# Patient Record
Sex: FEMALE
[De-identification: names, ages, dates, MRNs, and addresses within clinical notes are randomized per-mention and may not be internally consistent; named-entity substitution may affect disease eponyms.]

---

## 2018-06-25 ENCOUNTER — CHARTING TRANS (OUTPATIENT)
Dept: OTHER | Age: 23
End: 2018-06-25

## 2018-06-25 ENCOUNTER — LAB SERVICES (OUTPATIENT)
Dept: OTHER | Age: 23
End: 2018-06-25

## 2018-06-25 LAB
APPEARANCE: NORMAL
BILIRUBIN: NORMAL
COLOR: NORMAL
GLUCOSE U: NORMAL
KETONES: NORMAL
LEUKOCYTES: NORMAL
NITRITE: NORMAL
OCCULT BLOOD: NORMAL
PH: 6
PROTEIN: NORMAL
URINE SPEC GRAVITY: 1
UROBILINOGEN: 0.2

## 2018-10-31 VITALS
DIASTOLIC BLOOD PRESSURE: 88 MMHG | TEMPERATURE: 98.6 F | SYSTOLIC BLOOD PRESSURE: 128 MMHG | HEART RATE: 78 BPM | RESPIRATION RATE: 16 BRPM

## 2022-06-21 ENCOUNTER — HOSPITAL ENCOUNTER (EMERGENCY)
Facility: HOSPITAL | Age: 27
Discharge: HOME | End: 2022-06-21
Payer: COMMERCIAL

## 2022-06-21 ENCOUNTER — APPOINTMENT (OUTPATIENT)
Dept: GENERAL RADIOLOGY | Facility: HOSPITAL | Age: 27
End: 2022-06-21
Attending: NURSE PRACTITIONER
Payer: COMMERCIAL

## 2022-06-21 VITALS
WEIGHT: 250 LBS | HEART RATE: 88 BPM | SYSTOLIC BLOOD PRESSURE: 126 MMHG | RESPIRATION RATE: 18 BRPM | DIASTOLIC BLOOD PRESSURE: 82 MMHG | BODY MASS INDEX: 40.18 KG/M2 | TEMPERATURE: 98 F | HEIGHT: 66 IN | OXYGEN SATURATION: 94 %

## 2022-06-21 DIAGNOSIS — S93.401A SPRAIN OF RIGHT ANKLE, UNSPECIFIED LIGAMENT, INITIAL ENCOUNTER: Primary | ICD-10-CM

## 2022-06-21 DIAGNOSIS — R52 PAIN: ICD-10-CM

## 2022-06-21 PROCEDURE — 73630 X-RAY EXAM OF FOOT: CPT | Performed by: NURSE PRACTITIONER

## 2022-06-21 PROCEDURE — 73610 X-RAY EXAM OF ANKLE: CPT | Performed by: NURSE PRACTITIONER

## 2022-06-21 PROCEDURE — 99283 EMERGENCY DEPT VISIT LOW MDM: CPT

## 2022-06-21 RX ORDER — VENLAFAXINE 37.5 MG/1
37.5 TABLET ORAL 2 TIMES DAILY
COMMUNITY

## 2022-06-22 NOTE — ED INITIAL ASSESSMENT (HPI)
Pt c/o right ankle pain. Pt states she fell and twisted ankle. Per pt she had sx on this ankle in the past. Pt can walk on ankle.

## 2022-06-22 NOTE — ED QUICK NOTES
Patient safe to DC home per MD. Discharge instructions reviewed with patient, including when and how to follow up. Patient verbalizes understanding. Patient ambulatory with steady gait to exit.

## 2022-09-27 RX ORDER — FLUOXETINE HYDROCHLORIDE 20 MG/1
CAPSULE ORAL
Qty: 30 CAPSULE | OUTPATIENT
Start: 2022-09-27

## 2023-08-16 ENCOUNTER — HOSPITAL ENCOUNTER (EMERGENCY)
Facility: HOSPITAL | Age: 28
Discharge: HOME | End: 2023-08-16
Attending: EMERGENCY MEDICINE
Payer: COMMERCIAL

## 2023-08-16 ENCOUNTER — APPOINTMENT (OUTPATIENT)
Dept: GENERAL RADIOLOGY | Facility: HOSPITAL | Age: 28
End: 2023-08-16
Attending: EMERGENCY MEDICINE
Payer: COMMERCIAL

## 2023-08-16 VITALS
TEMPERATURE: 99 F | DIASTOLIC BLOOD PRESSURE: 83 MMHG | BODY MASS INDEX: 38.57 KG/M2 | HEIGHT: 66 IN | RESPIRATION RATE: 13 BRPM | SYSTOLIC BLOOD PRESSURE: 132 MMHG | OXYGEN SATURATION: 100 % | WEIGHT: 240 LBS | HEART RATE: 91 BPM

## 2023-08-16 DIAGNOSIS — R07.9 CHEST PAIN OF UNCERTAIN ETIOLOGY: Primary | ICD-10-CM

## 2023-08-16 LAB
ANION GAP SERPL CALC-SCNC: 7 MMOL/L (ref 0–18)
ATRIAL RATE: 84 BPM
BASOPHILS # BLD AUTO: 0.05 X10(3) UL (ref 0–0.2)
BASOPHILS NFR BLD AUTO: 0.6 %
BUN BLD-MCNC: 15 MG/DL (ref 7–18)
BUN/CREAT SERPL: 15.6 (ref 10–20)
CALCIUM BLD-MCNC: 9.2 MG/DL (ref 8.5–10.1)
CHLORIDE SERPL-SCNC: 107 MMOL/L (ref 98–112)
CO2 SERPL-SCNC: 24 MMOL/L (ref 21–32)
CREAT BLD-MCNC: 0.96 MG/DL
D DIMER PPP FEU-MCNC: 0.3 UG/ML FEU (ref ?–0.5)
DEPRECATED RDW RBC AUTO: 42.5 FL (ref 35.1–46.3)
EGFRCR SERPLBLD CKD-EPI 2021: 82 ML/MIN/1.73M2 (ref 60–?)
EOSINOPHIL # BLD AUTO: 0.1 X10(3) UL (ref 0–0.7)
EOSINOPHIL NFR BLD AUTO: 1.2 %
ERYTHROCYTE [DISTWIDTH] IN BLOOD BY AUTOMATED COUNT: 12.9 % (ref 11–15)
GLUCOSE BLD-MCNC: 101 MG/DL (ref 70–99)
HCT VFR BLD AUTO: 41.2 %
HGB BLD-MCNC: 13.7 G/DL
IMM GRANULOCYTES # BLD AUTO: 0.02 X10(3) UL (ref 0–1)
IMM GRANULOCYTES NFR BLD: 0.2 %
INR BLD: 0.97 (ref 0.85–1.16)
LYMPHOCYTES # BLD AUTO: 2.03 X10(3) UL (ref 1–4)
LYMPHOCYTES NFR BLD AUTO: 23.4 %
MCH RBC QN AUTO: 29.9 PG (ref 26–34)
MCHC RBC AUTO-ENTMCNC: 33.3 G/DL (ref 31–37)
MCV RBC AUTO: 90 FL
MONOCYTES # BLD AUTO: 0.65 X10(3) UL (ref 0.1–1)
MONOCYTES NFR BLD AUTO: 7.5 %
NEUTROPHILS # BLD AUTO: 5.82 X10 (3) UL (ref 1.5–7.7)
NEUTROPHILS # BLD AUTO: 5.82 X10(3) UL (ref 1.5–7.7)
NEUTROPHILS NFR BLD AUTO: 67.1 %
NT-PROBNP SERPL-MCNC: 10 PG/ML (ref ?–125)
OSMOLALITY SERPL CALC.SUM OF ELEC: 287 MOSM/KG (ref 275–295)
P AXIS: 41 DEGREES
P-R INTERVAL: 138 MS
PLATELET # BLD AUTO: 371 10(3)UL (ref 150–450)
POTASSIUM SERPL-SCNC: 3.9 MMOL/L (ref 3.5–5.1)
PROTHROMBIN TIME: 12.8 SECONDS (ref 11.6–14.8)
Q-T INTERVAL: 366 MS
QRS DURATION: 72 MS
QTC CALCULATION (BEZET): 432 MS
R AXIS: 5 DEGREES
RBC # BLD AUTO: 4.58 X10(6)UL
SODIUM SERPL-SCNC: 138 MMOL/L (ref 136–145)
T AXIS: 22 DEGREES
TROPONIN I HIGH SENSITIVITY: 4 NG/L
VENTRICULAR RATE: 84 BPM
WBC # BLD AUTO: 8.7 X10(3) UL (ref 4–11)

## 2023-08-16 PROCEDURE — 85025 COMPLETE CBC W/AUTO DIFF WBC: CPT | Performed by: EMERGENCY MEDICINE

## 2023-08-16 PROCEDURE — 85610 PROTHROMBIN TIME: CPT | Performed by: EMERGENCY MEDICINE

## 2023-08-16 PROCEDURE — 93005 ELECTROCARDIOGRAM TRACING: CPT

## 2023-08-16 PROCEDURE — 99285 EMERGENCY DEPT VISIT HI MDM: CPT

## 2023-08-16 PROCEDURE — 85379 FIBRIN DEGRADATION QUANT: CPT | Performed by: EMERGENCY MEDICINE

## 2023-08-16 PROCEDURE — 83880 ASSAY OF NATRIURETIC PEPTIDE: CPT | Performed by: EMERGENCY MEDICINE

## 2023-08-16 PROCEDURE — 71045 X-RAY EXAM CHEST 1 VIEW: CPT | Performed by: EMERGENCY MEDICINE

## 2023-08-16 PROCEDURE — 80048 BASIC METABOLIC PNL TOTAL CA: CPT | Performed by: EMERGENCY MEDICINE

## 2023-08-16 PROCEDURE — 93010 ELECTROCARDIOGRAM REPORT: CPT

## 2023-08-16 PROCEDURE — 84484 ASSAY OF TROPONIN QUANT: CPT | Performed by: EMERGENCY MEDICINE

## 2023-08-16 PROCEDURE — 96374 THER/PROPH/DIAG INJ IV PUSH: CPT

## 2023-08-16 PROCEDURE — S0028 INJECTION, FAMOTIDINE, 20 MG: HCPCS | Performed by: EMERGENCY MEDICINE

## 2023-08-16 RX ORDER — MAGNESIUM HYDROXIDE/ALUMINUM HYDROXICE/SIMETHICONE 120; 1200; 1200 MG/30ML; MG/30ML; MG/30ML
30 SUSPENSION ORAL ONCE
Status: COMPLETED | OUTPATIENT
Start: 2023-08-16 | End: 2023-08-16

## 2023-08-16 RX ORDER — FAMOTIDINE 10 MG/ML
20 INJECTION, SOLUTION INTRAVENOUS ONCE
Status: COMPLETED | OUTPATIENT
Start: 2023-08-16 | End: 2023-08-16

## 2023-08-16 RX ORDER — PANTOPRAZOLE SODIUM 40 MG/1
TABLET, DELAYED RELEASE ORAL
Qty: 30 TABLET | Refills: 0 | Status: SHIPPED | OUTPATIENT
Start: 2023-08-16

## 2023-08-16 RX ORDER — BUPROPION HYDROCHLORIDE 300 MG/1
300 TABLET ORAL DAILY
COMMUNITY

## 2023-08-16 NOTE — ED INITIAL ASSESSMENT (HPI)
Past 1-1.5 hr pta, non-radiating constant lower midsternal pressure with some tightness to thoracic area. Denies sob. Pt does note feeling \"weak\" - stating feeling \"winded and a lot more difficult to do it\". Denies recent long distance trips, smoking, obc. No otc rx pta. No exacerbating/alleviating factors. 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

## 2023-08-21 ENCOUNTER — OFFICE VISIT (OUTPATIENT)
Dept: OBGYN CLINIC | Facility: CLINIC | Age: 28
End: 2023-08-21

## 2023-08-21 VITALS
HEART RATE: 86 BPM | BODY MASS INDEX: 39 KG/M2 | DIASTOLIC BLOOD PRESSURE: 86 MMHG | WEIGHT: 240.19 LBS | SYSTOLIC BLOOD PRESSURE: 125 MMHG

## 2023-08-21 DIAGNOSIS — N94.3 PMS (PREMENSTRUAL SYNDROME): ICD-10-CM

## 2023-08-21 DIAGNOSIS — N91.5 LIGHT MENSTRUAL FLOW: Primary | ICD-10-CM

## 2023-08-21 DIAGNOSIS — Z11.3 ROUTINE SCREENING FOR STI (SEXUALLY TRANSMITTED INFECTION): ICD-10-CM

## 2023-08-21 PROCEDURE — 3079F DIAST BP 80-89 MM HG: CPT | Performed by: NURSE PRACTITIONER

## 2023-08-21 PROCEDURE — 3074F SYST BP LT 130 MM HG: CPT | Performed by: NURSE PRACTITIONER

## 2023-08-21 PROCEDURE — 99203 OFFICE O/P NEW LOW 30 MIN: CPT | Performed by: NURSE PRACTITIONER

## 2023-08-21 RX ORDER — RIZATRIPTAN BENZOATE 5 MG/1
5 TABLET ORAL AS NEEDED
COMMUNITY

## 2023-09-12 ENCOUNTER — LAB ENCOUNTER (OUTPATIENT)
Dept: LAB | Facility: HOSPITAL | Age: 28
End: 2023-09-12
Attending: NURSE PRACTITIONER
Payer: COMMERCIAL

## 2023-09-12 DIAGNOSIS — N94.3 PMS (PREMENSTRUAL SYNDROME): ICD-10-CM

## 2023-09-12 LAB
CHOLEST SERPL-MCNC: 210 MG/DL (ref ?–200)
DHEA-S SERPL-MCNC: 215.1 UG/DL
EST. AVERAGE GLUCOSE BLD GHB EST-MCNC: 114 MG/DL (ref 68–126)
ESTRADIOL SERPL-MCNC: 50.5 PG/ML
FASTING PATIENT LIPID ANSWER: YES
FSH SERPL-ACNC: 6.2 MIU/ML
HBA1C MFR BLD: 5.6 % (ref ?–5.7)
HBV SURFACE AG SER-ACNC: <0.1 [IU]/L
HBV SURFACE AG SERPL QL IA: NONREACTIVE
HCV AB SERPL QL IA: NONREACTIVE
HDLC SERPL-MCNC: 71 MG/DL (ref 40–59)
LDLC SERPL CALC-MCNC: 123 MG/DL (ref ?–100)
LH SERPL-ACNC: 6.5 MIU/ML
NONHDLC SERPL-MCNC: 139 MG/DL (ref ?–130)
PROLACTIN SERPL-MCNC: 12.5 NG/ML
TRIGL SERPL-MCNC: 91 MG/DL (ref 30–149)
TSI SER-ACNC: 1.81 MIU/ML (ref 0.36–3.74)
VIT D+METAB SERPL-MCNC: 21.8 NG/ML (ref 30–100)
VLDLC SERPL CALC-MCNC: 16 MG/DL (ref 0–30)

## 2023-09-12 PROCEDURE — 84146 ASSAY OF PROLACTIN: CPT | Performed by: NURSE PRACTITIONER

## 2023-09-12 PROCEDURE — 84443 ASSAY THYROID STIM HORMONE: CPT | Performed by: NURSE PRACTITIONER

## 2023-09-12 PROCEDURE — 84410 TESTOSTERONE BIOAVAILABLE: CPT | Performed by: NURSE PRACTITIONER

## 2023-09-12 PROCEDURE — 87591 N.GONORRHOEAE DNA AMP PROB: CPT | Performed by: NURSE PRACTITIONER

## 2023-09-12 PROCEDURE — 82306 VITAMIN D 25 HYDROXY: CPT

## 2023-09-12 PROCEDURE — 87389 HIV-1 AG W/HIV-1&-2 AB AG IA: CPT | Performed by: NURSE PRACTITIONER

## 2023-09-12 PROCEDURE — 80061 LIPID PANEL: CPT | Performed by: NURSE PRACTITIONER

## 2023-09-12 PROCEDURE — 87491 CHLMYD TRACH DNA AMP PROBE: CPT | Performed by: NURSE PRACTITIONER

## 2023-09-12 PROCEDURE — 82670 ASSAY OF TOTAL ESTRADIOL: CPT | Performed by: NURSE PRACTITIONER

## 2023-09-12 PROCEDURE — 83036 HEMOGLOBIN GLYCOSYLATED A1C: CPT | Performed by: NURSE PRACTITIONER

## 2023-09-12 PROCEDURE — 82627 DEHYDROEPIANDROSTERONE: CPT | Performed by: NURSE PRACTITIONER

## 2023-09-12 PROCEDURE — 86780 TREPONEMA PALLIDUM: CPT | Performed by: NURSE PRACTITIONER

## 2023-09-12 PROCEDURE — 86803 HEPATITIS C AB TEST: CPT | Performed by: NURSE PRACTITIONER

## 2023-09-12 PROCEDURE — 87340 HEPATITIS B SURFACE AG IA: CPT | Performed by: NURSE PRACTITIONER

## 2023-09-12 PROCEDURE — 83001 ASSAY OF GONADOTROPIN (FSH): CPT | Performed by: NURSE PRACTITIONER

## 2023-09-12 PROCEDURE — 83002 ASSAY OF GONADOTROPIN (LH): CPT | Performed by: NURSE PRACTITIONER

## 2023-09-12 PROCEDURE — 36415 COLL VENOUS BLD VENIPUNCTURE: CPT | Performed by: NURSE PRACTITIONER

## 2023-09-13 LAB
C TRACH DNA SPEC QL NAA+PROBE: NEGATIVE
N GONORRHOEA DNA SPEC QL NAA+PROBE: NEGATIVE
T PALLIDUM AB SER QL: NEGATIVE

## 2023-09-18 ENCOUNTER — OFFICE VISIT (OUTPATIENT)
Dept: FAMILY MEDICINE CLINIC | Facility: CLINIC | Age: 28
End: 2023-09-18
Payer: COMMERCIAL

## 2023-09-18 VITALS
RESPIRATION RATE: 16 BRPM | OXYGEN SATURATION: 97 % | BODY MASS INDEX: 38.41 KG/M2 | SYSTOLIC BLOOD PRESSURE: 108 MMHG | TEMPERATURE: 98 F | HEIGHT: 66 IN | DIASTOLIC BLOOD PRESSURE: 70 MMHG | HEART RATE: 72 BPM | WEIGHT: 239 LBS

## 2023-09-18 DIAGNOSIS — H69.92 ACUTE DYSFUNCTION OF LEFT EUSTACHIAN TUBE: Primary | ICD-10-CM

## 2023-09-18 PROCEDURE — 3078F DIAST BP <80 MM HG: CPT | Performed by: NURSE PRACTITIONER

## 2023-09-18 PROCEDURE — 3008F BODY MASS INDEX DOCD: CPT | Performed by: NURSE PRACTITIONER

## 2023-09-18 PROCEDURE — 99202 OFFICE O/P NEW SF 15 MIN: CPT | Performed by: NURSE PRACTITIONER

## 2023-09-18 PROCEDURE — 3074F SYST BP LT 130 MM HG: CPT | Performed by: NURSE PRACTITIONER

## 2023-09-19 LAB
SEX HORM BIND GLOB: 66.3 NMOL/L
TESTOST % FREE+WEAK BND: 12.4 %
TESTOST FREE+WEAK BND: 3.8 NG/DL
TESTOSTERONE TOT /MS: 30.9 NG/DL

## 2023-09-25 ENCOUNTER — OFFICE VISIT (OUTPATIENT)
Dept: OBGYN CLINIC | Facility: CLINIC | Age: 28
End: 2023-09-25

## 2023-09-25 VITALS
HEART RATE: 91 BPM | DIASTOLIC BLOOD PRESSURE: 78 MMHG | BODY MASS INDEX: 38 KG/M2 | SYSTOLIC BLOOD PRESSURE: 125 MMHG | WEIGHT: 237.81 LBS

## 2023-09-25 DIAGNOSIS — N94.6 DYSMENORRHEA: ICD-10-CM

## 2023-09-25 DIAGNOSIS — L91.8 SKIN TAG: ICD-10-CM

## 2023-09-25 DIAGNOSIS — Z01.419 WELL WOMAN EXAM WITH ROUTINE GYNECOLOGICAL EXAM: Primary | ICD-10-CM

## 2023-09-25 DIAGNOSIS — N91.5 LIGHT MENSTRUAL FLOW: ICD-10-CM

## 2023-09-25 PROCEDURE — 3078F DIAST BP <80 MM HG: CPT | Performed by: NURSE PRACTITIONER

## 2023-09-25 PROCEDURE — 3074F SYST BP LT 130 MM HG: CPT | Performed by: NURSE PRACTITIONER

## 2023-09-25 PROCEDURE — 99395 PREV VISIT EST AGE 18-39: CPT | Performed by: NURSE PRACTITIONER

## 2023-09-25 RX ORDER — RIZATRIPTAN BENZOATE 10 MG/1
TABLET, ORALLY DISINTEGRATING ORAL
COMMUNITY
Start: 2023-05-14

## 2023-10-05 ENCOUNTER — OFFICE VISIT (OUTPATIENT)
Dept: OBGYN CLINIC | Facility: CLINIC | Age: 28
End: 2023-10-05

## 2023-10-05 VITALS
DIASTOLIC BLOOD PRESSURE: 81 MMHG | HEART RATE: 71 BPM | WEIGHT: 235.63 LBS | SYSTOLIC BLOOD PRESSURE: 115 MMHG | BODY MASS INDEX: 38 KG/M2

## 2023-10-05 DIAGNOSIS — N90.89 VULVAR LESION: Primary | ICD-10-CM

## 2023-10-05 PROCEDURE — 3079F DIAST BP 80-89 MM HG: CPT | Performed by: NURSE PRACTITIONER

## 2023-10-05 PROCEDURE — 3074F SYST BP LT 130 MM HG: CPT | Performed by: NURSE PRACTITIONER

## 2023-10-05 PROCEDURE — 56605 BIOPSY OF VULVA/PERINEUM: CPT | Performed by: NURSE PRACTITIONER

## 2023-10-05 NOTE — PROCEDURES
Vulvar Biopsy         Consent signed. Procedure discussed with patient in detail including indication, risk, benefits, alternatives and complications. Lesion Description: darj pedunculated skin lesion on left vulva    Procedure:  Betadine wash of procedural site. 1% lidocaine without epinephrine used topically for local anesthesia. Vulvar biopsy done using #1  sharp dissection of area. Silver nitrate used to achieve hemostasis. Good hemostasis noted. Patient tolerated procedure well. Plan:  Site care discussed with patient. Neosporin twice a day.   Sent to pathology

## 2023-10-23 ENCOUNTER — HOSPITAL ENCOUNTER (OUTPATIENT)
Dept: ULTRASOUND IMAGING | Facility: HOSPITAL | Age: 28
Discharge: HOME | End: 2023-10-23
Attending: NURSE PRACTITIONER

## 2023-10-23 DIAGNOSIS — N91.5 LIGHT MENSTRUAL FLOW: ICD-10-CM

## 2023-10-23 DIAGNOSIS — N94.6 DYSMENORRHEA: ICD-10-CM

## 2023-10-23 PROCEDURE — 76830 TRANSVAGINAL US NON-OB: CPT | Performed by: NURSE PRACTITIONER

## 2023-10-23 PROCEDURE — 76856 US EXAM PELVIC COMPLETE: CPT | Performed by: NURSE PRACTITIONER

## 2023-12-08 ENCOUNTER — OFFICE VISIT (OUTPATIENT)
Dept: FAMILY MEDICINE CLINIC | Facility: CLINIC | Age: 28
End: 2023-12-08
Payer: COMMERCIAL

## 2023-12-08 VITALS
HEIGHT: 66 IN | HEART RATE: 86 BPM | WEIGHT: 130 LBS | SYSTOLIC BLOOD PRESSURE: 128 MMHG | DIASTOLIC BLOOD PRESSURE: 72 MMHG | TEMPERATURE: 98 F | BODY MASS INDEX: 20.89 KG/M2 | RESPIRATION RATE: 18 BRPM | OXYGEN SATURATION: 97 %

## 2023-12-08 DIAGNOSIS — H10.33 ACUTE CONJUNCTIVITIS OF BOTH EYES, UNSPECIFIED ACUTE CONJUNCTIVITIS TYPE: ICD-10-CM

## 2023-12-08 DIAGNOSIS — J06.9 VIRAL URI WITH COUGH: Primary | ICD-10-CM

## 2023-12-08 PROCEDURE — 3078F DIAST BP <80 MM HG: CPT | Performed by: NURSE PRACTITIONER

## 2023-12-08 PROCEDURE — 3074F SYST BP LT 130 MM HG: CPT | Performed by: NURSE PRACTITIONER

## 2023-12-08 PROCEDURE — 3008F BODY MASS INDEX DOCD: CPT | Performed by: NURSE PRACTITIONER

## 2023-12-08 PROCEDURE — 99213 OFFICE O/P EST LOW 20 MIN: CPT | Performed by: NURSE PRACTITIONER

## 2023-12-08 RX ORDER — OFLOXACIN 3 MG/ML
SOLUTION/ DROPS OPHTHALMIC
Qty: 1 EACH | Refills: 0 | Status: SHIPPED | OUTPATIENT
Start: 2023-12-08

## 2024-01-22 ENCOUNTER — APPOINTMENT (OUTPATIENT)
Dept: GENERAL RADIOLOGY | Facility: HOSPITAL | Age: 29
End: 2024-01-22
Attending: EMERGENCY MEDICINE
Payer: COMMERCIAL

## 2024-01-22 ENCOUNTER — APPOINTMENT (OUTPATIENT)
Dept: ULTRASOUND IMAGING | Facility: HOSPITAL | Age: 29
End: 2024-01-22
Attending: EMERGENCY MEDICINE
Payer: COMMERCIAL

## 2024-01-22 ENCOUNTER — APPOINTMENT (OUTPATIENT)
Dept: CT IMAGING | Facility: HOSPITAL | Age: 29
End: 2024-01-22
Attending: EMERGENCY MEDICINE
Payer: COMMERCIAL

## 2024-01-22 ENCOUNTER — HOSPITAL ENCOUNTER (OUTPATIENT)
Facility: HOSPITAL | Age: 29
Setting detail: OBSERVATION
Discharge: HOME | End: 2024-01-23
Attending: EMERGENCY MEDICINE | Admitting: HOSPITALIST
Payer: COMMERCIAL

## 2024-01-22 DIAGNOSIS — R07.9 CHEST PAIN OF UNCERTAIN ETIOLOGY: ICD-10-CM

## 2024-01-22 DIAGNOSIS — R11.10 INTRACTABLE VOMITING: ICD-10-CM

## 2024-01-22 DIAGNOSIS — R10.13 EPIGASTRIC PAIN: Primary | ICD-10-CM

## 2024-01-22 PROBLEM — D72.829 LEUKOCYTOSIS: Status: ACTIVE | Noted: 2024-01-22

## 2024-01-22 PROBLEM — E87.20 METABOLIC ACIDOSIS: Status: ACTIVE | Noted: 2024-01-22

## 2024-01-22 PROBLEM — E87.5 HYPERKALEMIA: Status: ACTIVE | Noted: 2024-01-22

## 2024-01-22 PROBLEM — R73.9 HYPERGLYCEMIA: Status: ACTIVE | Noted: 2024-01-22

## 2024-01-22 LAB
ALBUMIN SERPL-MCNC: 4.8 G/DL (ref 3.2–4.8)
ALBUMIN/GLOB SERPL: 1.6 {RATIO} (ref 1–2)
ALP LIVER SERPL-CCNC: 75 U/L
ALT SERPL-CCNC: 33 U/L
ANION GAP SERPL CALC-SCNC: 9 MMOL/L (ref 0–18)
AST SERPL-CCNC: 36 U/L (ref ?–34)
B-HCG UR QL: NEGATIVE
BASOPHILS # BLD AUTO: 0.06 X10(3) UL (ref 0–0.2)
BASOPHILS NFR BLD AUTO: 0.3 %
BILIRUB SERPL-MCNC: 0.5 MG/DL (ref 0.3–1.2)
BUN BLD-MCNC: 13 MG/DL (ref 9–23)
BUN/CREAT SERPL: 12.7 (ref 10–20)
CALCIUM BLD-MCNC: 9.3 MG/DL (ref 8.7–10.4)
CHLORIDE SERPL-SCNC: 107 MMOL/L (ref 98–112)
CO2 SERPL-SCNC: 21 MMOL/L (ref 21–32)
CREAT BLD-MCNC: 1.02 MG/DL
D DIMER PPP FEU-MCNC: 0.5 UG/ML FEU (ref ?–0.5)
DEPRECATED RDW RBC AUTO: 42.4 FL (ref 35.1–46.3)
EGFRCR SERPLBLD CKD-EPI 2021: 76 ML/MIN/1.73M2 (ref 60–?)
EOSINOPHIL # BLD AUTO: 0.01 X10(3) UL (ref 0–0.7)
EOSINOPHIL NFR BLD AUTO: 0.1 %
ERYTHROCYTE [DISTWIDTH] IN BLOOD BY AUTOMATED COUNT: 13.2 % (ref 11–15)
FLUAV + FLUBV RNA SPEC NAA+PROBE: NEGATIVE
FLUAV + FLUBV RNA SPEC NAA+PROBE: NEGATIVE
GLOBULIN PLAS-MCNC: 3 G/DL (ref 2.8–4.4)
GLUCOSE BLD-MCNC: 143 MG/DL (ref 70–99)
HCT VFR BLD AUTO: 40.1 %
HGB BLD-MCNC: 13.5 G/DL
IMM GRANULOCYTES # BLD AUTO: 0.06 X10(3) UL (ref 0–1)
IMM GRANULOCYTES NFR BLD: 0.3 %
LIPASE SERPL-CCNC: 30 U/L (ref 13–75)
LYMPHOCYTES # BLD AUTO: 0.98 X10(3) UL (ref 1–4)
LYMPHOCYTES NFR BLD AUTO: 5.7 %
MCH RBC QN AUTO: 29.7 PG (ref 26–34)
MCHC RBC AUTO-ENTMCNC: 33.7 G/DL (ref 31–37)
MCV RBC AUTO: 88.1 FL
MONOCYTES # BLD AUTO: 0.52 X10(3) UL (ref 0.1–1)
MONOCYTES NFR BLD AUTO: 3 %
NEUTROPHILS # BLD AUTO: 15.6 X10 (3) UL (ref 1.5–7.7)
NEUTROPHILS # BLD AUTO: 15.6 X10(3) UL (ref 1.5–7.7)
NEUTROPHILS NFR BLD AUTO: 90.6 %
OSMOLALITY SERPL CALC.SUM OF ELEC: 287 MOSM/KG (ref 275–295)
PLATELET # BLD AUTO: 392 10(3)UL (ref 150–450)
POTASSIUM SERPL-SCNC: 5.2 MMOL/L (ref 3.5–5.1)
PROT SERPL-MCNC: 7.8 G/DL (ref 5.7–8.2)
RBC # BLD AUTO: 4.55 X10(6)UL
RSV RNA SPEC NAA+PROBE: NEGATIVE
SARS-COV-2 RNA RESP QL NAA+PROBE: NOT DETECTED
SODIUM SERPL-SCNC: 137 MMOL/L (ref 136–145)
TROPONIN I SERPL HS-MCNC: <3 NG/L
WBC # BLD AUTO: 17.2 X10(3) UL (ref 4–11)

## 2024-01-22 PROCEDURE — 96374 THER/PROPH/DIAG INJ IV PUSH: CPT

## 2024-01-22 PROCEDURE — 96376 TX/PRO/DX INJ SAME DRUG ADON: CPT

## 2024-01-22 PROCEDURE — 96375 TX/PRO/DX INJ NEW DRUG ADDON: CPT

## 2024-01-22 PROCEDURE — 83690 ASSAY OF LIPASE: CPT | Performed by: EMERGENCY MEDICINE

## 2024-01-22 PROCEDURE — 80053 COMPREHEN METABOLIC PANEL: CPT | Performed by: EMERGENCY MEDICINE

## 2024-01-22 PROCEDURE — 81025 URINE PREGNANCY TEST: CPT

## 2024-01-22 PROCEDURE — 99285 EMERGENCY DEPT VISIT HI MDM: CPT

## 2024-01-22 PROCEDURE — 85025 COMPLETE CBC W/AUTO DIFF WBC: CPT | Performed by: EMERGENCY MEDICINE

## 2024-01-22 PROCEDURE — 93005 ELECTROCARDIOGRAM TRACING: CPT

## 2024-01-22 PROCEDURE — 0241U SARS-COV-2/FLU A AND B/RSV BY PCR (GENEXPERT): CPT | Performed by: EMERGENCY MEDICINE

## 2024-01-22 PROCEDURE — 84484 ASSAY OF TROPONIN QUANT: CPT | Performed by: EMERGENCY MEDICINE

## 2024-01-22 PROCEDURE — C9113 INJ PANTOPRAZOLE SODIUM, VIA: HCPCS | Performed by: EMERGENCY MEDICINE

## 2024-01-22 PROCEDURE — 85379 FIBRIN DEGRADATION QUANT: CPT | Performed by: EMERGENCY MEDICINE

## 2024-01-22 PROCEDURE — 71045 X-RAY EXAM CHEST 1 VIEW: CPT | Performed by: EMERGENCY MEDICINE

## 2024-01-22 PROCEDURE — 71260 CT THORAX DX C+: CPT | Performed by: EMERGENCY MEDICINE

## 2024-01-22 PROCEDURE — 76705 ECHO EXAM OF ABDOMEN: CPT | Performed by: EMERGENCY MEDICINE

## 2024-01-22 PROCEDURE — 93010 ELECTROCARDIOGRAM REPORT: CPT

## 2024-01-22 PROCEDURE — S0119 ONDANSETRON 4 MG: HCPCS

## 2024-01-22 PROCEDURE — S0028 INJECTION, FAMOTIDINE, 20 MG: HCPCS | Performed by: EMERGENCY MEDICINE

## 2024-01-22 PROCEDURE — 96361 HYDRATE IV INFUSION ADD-ON: CPT

## 2024-01-22 RX ORDER — FAMOTIDINE 10 MG/ML
20 INJECTION, SOLUTION INTRAVENOUS ONCE
Status: COMPLETED | OUTPATIENT
Start: 2024-01-22 | End: 2024-01-22

## 2024-01-22 RX ORDER — ONDANSETRON 2 MG/ML
4 INJECTION INTRAMUSCULAR; INTRAVENOUS ONCE
Status: COMPLETED | OUTPATIENT
Start: 2024-01-22 | End: 2024-01-22

## 2024-01-22 RX ORDER — MORPHINE SULFATE 2 MG/ML
2 INJECTION, SOLUTION INTRAMUSCULAR; INTRAVENOUS ONCE
Status: COMPLETED | OUTPATIENT
Start: 2024-01-22 | End: 2024-01-22

## 2024-01-22 RX ORDER — DROPERIDOL 2.5 MG/ML
1.25 INJECTION, SOLUTION INTRAMUSCULAR; INTRAVENOUS ONCE
Status: COMPLETED | OUTPATIENT
Start: 2024-01-22 | End: 2024-01-22

## 2024-01-22 RX ORDER — METOCLOPRAMIDE HYDROCHLORIDE 5 MG/ML
5 INJECTION INTRAMUSCULAR; INTRAVENOUS ONCE
Status: COMPLETED | OUTPATIENT
Start: 2024-01-22 | End: 2024-01-22

## 2024-01-22 RX ORDER — MORPHINE SULFATE 4 MG/ML
4 INJECTION, SOLUTION INTRAMUSCULAR; INTRAVENOUS ONCE
Status: COMPLETED | OUTPATIENT
Start: 2024-01-22 | End: 2024-01-22

## 2024-01-22 RX ORDER — DIPHENHYDRAMINE HYDROCHLORIDE 50 MG/ML
25 INJECTION INTRAMUSCULAR; INTRAVENOUS ONCE
Status: COMPLETED | OUTPATIENT
Start: 2024-01-22 | End: 2024-01-22

## 2024-01-22 RX ORDER — ONDANSETRON 4 MG/1
4 TABLET, ORALLY DISINTEGRATING ORAL ONCE
Status: COMPLETED | OUTPATIENT
Start: 2024-01-22 | End: 2024-01-22

## 2024-01-23 VITALS
DIASTOLIC BLOOD PRESSURE: 69 MMHG | WEIGHT: 248 LBS | HEIGHT: 66 IN | BODY MASS INDEX: 39.86 KG/M2 | RESPIRATION RATE: 18 BRPM | TEMPERATURE: 100 F | SYSTOLIC BLOOD PRESSURE: 116 MMHG | OXYGEN SATURATION: 96 % | HEART RATE: 103 BPM

## 2024-01-23 PROBLEM — R07.9 CHEST PAIN OF UNCERTAIN ETIOLOGY: Status: ACTIVE | Noted: 2024-01-23

## 2024-01-23 PROBLEM — R11.10 INTRACTABLE VOMITING: Status: ACTIVE | Noted: 2024-01-23

## 2024-01-23 LAB
AMPHET UR QL SCN: NEGATIVE
ATRIAL RATE: 93 BPM
BARBITURATES UR QL SCN: NEGATIVE
BENZODIAZ UR QL SCN: NEGATIVE
CANNABINOIDS UR QL SCN: NEGATIVE
COCAINE UR QL: NEGATIVE
CREAT UR-SCNC: 31.1 MG/DL
MDMA UR QL SCN: NEGATIVE
METHADONE UR QL SCN: NEGATIVE
OXYCODONE UR QL SCN: NEGATIVE
P AXIS: 29 DEGREES
P-R INTERVAL: 146 MS
PCP UR QL SCN: NEGATIVE
POTASSIUM SERPL-SCNC: 3.8 MMOL/L (ref 3.5–5.1)
Q-T INTERVAL: 358 MS
QRS DURATION: 78 MS
QTC CALCULATION (BEZET): 445 MS
R AXIS: 6 DEGREES
T AXIS: 41 DEGREES
VENTRICULAR RATE: 93 BPM

## 2024-01-23 PROCEDURE — 80307 DRUG TEST PRSMV CHEM ANLYZR: CPT | Performed by: EMERGENCY MEDICINE

## 2024-01-23 PROCEDURE — 84132 ASSAY OF SERUM POTASSIUM: CPT | Performed by: HOSPITALIST

## 2024-01-23 RX ORDER — PROCHLORPERAZINE EDISYLATE 5 MG/ML
5 INJECTION INTRAMUSCULAR; INTRAVENOUS EVERY 8 HOURS PRN
Status: DISCONTINUED | OUTPATIENT
Start: 2024-01-23 | End: 2024-01-23

## 2024-01-23 RX ORDER — PANTOPRAZOLE SODIUM 40 MG/1
40 TABLET, DELAYED RELEASE ORAL
Status: DISCONTINUED | OUTPATIENT
Start: 2024-01-23 | End: 2024-01-23

## 2024-01-23 RX ORDER — PANTOPRAZOLE SODIUM 40 MG/1
40 TABLET, DELAYED RELEASE ORAL
Qty: 7 TABLET | Refills: 0 | Status: SHIPPED | OUTPATIENT
Start: 2024-01-23

## 2024-01-23 RX ORDER — MORPHINE SULFATE 2 MG/ML
2 INJECTION, SOLUTION INTRAMUSCULAR; INTRAVENOUS EVERY 2 HOUR PRN
Status: DISCONTINUED | OUTPATIENT
Start: 2024-01-23 | End: 2024-01-23

## 2024-01-23 RX ORDER — MORPHINE SULFATE 2 MG/ML
1 INJECTION, SOLUTION INTRAMUSCULAR; INTRAVENOUS EVERY 2 HOUR PRN
Status: DISCONTINUED | OUTPATIENT
Start: 2024-01-23 | End: 2024-01-23

## 2024-01-23 RX ORDER — ACETAMINOPHEN 500 MG
500 TABLET ORAL EVERY 4 HOURS PRN
Status: DISCONTINUED | OUTPATIENT
Start: 2024-01-23 | End: 2024-01-23

## 2024-01-23 RX ORDER — ENOXAPARIN SODIUM 100 MG/ML
0.5 INJECTION SUBCUTANEOUS DAILY
Status: DISCONTINUED | OUTPATIENT
Start: 2024-01-23 | End: 2024-01-23

## 2024-01-23 RX ORDER — MORPHINE SULFATE 4 MG/ML
4 INJECTION, SOLUTION INTRAMUSCULAR; INTRAVENOUS EVERY 2 HOUR PRN
Status: DISCONTINUED | OUTPATIENT
Start: 2024-01-23 | End: 2024-01-23

## 2024-01-23 RX ORDER — ONDANSETRON 2 MG/ML
4 INJECTION INTRAMUSCULAR; INTRAVENOUS EVERY 6 HOURS PRN
Status: DISCONTINUED | OUTPATIENT
Start: 2024-01-23 | End: 2024-01-23

## 2024-01-23 RX ORDER — SODIUM CHLORIDE 9 MG/ML
INJECTION, SOLUTION INTRAVENOUS CONTINUOUS
Status: DISCONTINUED | OUTPATIENT
Start: 2024-01-23 | End: 2024-01-23

## 2024-01-23 NOTE — PROGRESS NOTES
[facility] Pharmacy Note:  Anticoagulation Weight Dose Adjustment for enoxaparin    Priya Soriano is a 28 year old patient who has been prescribed enoxaparin 40 mg every 24 hours.      Estimated Creatinine Clearance: 75.5 mL/min (based on SCr of 1.02 mg/dL). Body mass index is 40.03 kg/m². Wt Readings from Last 1 Encounters:   01/23/24 112.5 kg (248 lb 0.3 oz)        Per P&T approved dose adjustment protocol for weight and renal function, the dose will be adjusted to enoxaparin 60 mg every 24 hours.    Thank you,    Jose Finch, PharmD  1/23/2024  12:59 AM

## 2024-01-23 NOTE — DISCHARGE SUMMARY
[facility] Internal Medicine Discharge Summary   Patient ID:  Priya Soriano  [id number]  28 year old  5/26/1995    Admit date: 1/22/2024    Discharge date and time: 1/23/2024  1:29 PM      Attending Physician: No att. providers found     Primary Care Physician: Zaira Ram MD     Reason for admission n/v (see HPI on HP for further detail)    Discharged Condition: stable    Disposition: home    Risk of Readmission Lace+ Score: 25  59-90 High Risk  29-58 Medium Risk  0-28   Low Risk    Important follow up:  -Please avoid NSAIDS including diclofenac/ibuprofen/aleve etc    If needed in future, always take with food and consider acid supressor with medication     Discharge meds  I reviewed and reconciled current and discharge medications on the day of discharge with the changes reflected below.       Medication List        START taking these medications      pantoprazole 40 MG Tbec  Commonly known as: Protonix  Take 1 tablet (40 mg total) by mouth every morning before breakfast.            CONTINUE taking these medications      buPROPion  MG Tb24  Commonly known as: Wellbutrin XL     * Rizatriptan Benzoate 5 MG Tabs  Commonly known as: MAXALT     * rizatriptan 10 MG Tbdp  Commonly known as: Maxalt-MLT     sertraline 50 MG Tabs  Commonly known as: Zoloft           * This list has 2 medication(s) that are the same as other medications prescribed for you. Read the directions carefully, and ask your doctor or other care provider to review them with you.                   Where to Get Your Medications        These medications were sent to [facility]/pharmacy #[id number] - [place], [place] - [address] AT [facility], [phone], [phone]  [address] [facility] [address] [address]      Hours: 24-hours Phone: [phone]   pantoprazole 40 MG Tbec       HPI per chart  Priya Soriano is a 28 year old female with PMH sig for generalized anxiety disorder, major depression, migraine who  presented with nausea vomiting abdominal pain.  Symptoms now resolved.  Patient thinks that this is due to her recent use of diclofenac.  She used it this summer to after knee injury and she had similar symptoms.  Tolerating diet and like to go home.  No chest pain or shortness of breath.  No further nausea vomiting.  No diarrhea     Hospital Course  Priya Soriano is a 28 year old female with PMH sig for generalized anxiety disorder, major depression, migraine who presented with nausea vomiting abdominal pain.  Symptoms now resolved.  Patient thinks that this is due to her recent use of diclofenac.  She used it this summer to after knee injury and she had similar symptoms.  Tolerating diet and like to go home.  No chest pain or shortness of breath.  No further nausea vomiting.  No diarrhea   Discharge Diagnoses:   Nausea vomiting without diarrhea  - Symptoms are resolved, likely gastritis  - Patient advised to stop diclofenac and avoid all NSAIDs, continue PPI for 1 week and follow-up with PCP  - Patient tolerating diet prior to discharge     Generalized anxiety disorder, major pressure disorder pressure [place]  Migraines  - Continue home meds    Consults: None    Radiology: US GALLBLADDER (CPT=76705)    Result Date: 1/23/2024  PROCEDURE: US GALLBLADDER (CPT=76705)  COMPARISON: [facility], CT CHEST PE AORTA (IV ONLY) (CPT=71260), 1/22/2024, 9:30 PM.  INDICATIONS: Epigastric pain, nausea, and vomiting  TECHNIQUE:   The gallbladder was evaluated with grayscale ultrasound.   FINDINGS: Normal-sized gallbladder.  No calculi or wall thickening.  Slightly enlarged CBD measuring 7 millimeter.  Distal CBD is not visible.  Further workup could be considered if there is clinical/biochemical evidence of biliary obstruction.  The liver is likely fatty         CONCLUSION: No gallstones.  Slightly enlarged CBD, nonspecific  [facility] provided a prelim report for this exam. This final report has no  significant discrepancies with the [facility] report.    Dictated by (CST): Tha Palomares MD on 1/23/2024 at 6:59 AM     Finalized by (CST): Tha Palomares MD on 1/23/2024 at 7:00 AM          CT CHEST PE AORTA (IV ONLY) (CPT=71260)    Result Date: 1/22/2024  PROCEDURE: CT CHEST PE AORTA (IV ONLY) (CPT=71260)  COMPARISON: None.  INDICATIONS: Chest pain, vomiting, chills  TECHNIQUE: CT images of the chest were obtained with non-ionic intravenous contrast material.  Automated exposure control for dose reduction was used. Adjustment of the mA and/or kV was done based on the patient's size. Use of iterative reconstruction technique for dose reduction was used. Dose information is transmitted to the ACR (American College of Radiology) NRDR (National Radiology Data Registry) which includes the Dose Index Registry.  FINDINGS:  Mildly limited examination due to phase of contrast and motion artifact.   PULMONARY VASCULATURE: There is adequate opacification of the pulmonary arterial tree. No suspicious filling defects are identified in the main, lobar, or segmental pulmonary artery branches to suggest acute pulmonary embolism.   The subsegment branches are less well assessed. The main pulmonary artery trunk is normal in caliber.  CARDIAC: The heart is not enlarged. There is no bowing of the interventricular septum to suggest right ventricular strain. THORACIC AORTA: There is no ectasia of the ascending thoracic aorta without aneurysmal dilation.  MEDIASTINUM/FLORESITA: No mass or lymphadenopathy.    LUNGS/PLEURA: The trachea and central bronchi are clear.  There is minimal curvilinear opacity dependently within the lungs, most likely minimal subsegmental atelectasis.  CHEST WALL: No thyroidal mass, within the limitation of artifact. No axillary lymphadenopathy.  LIMITED ABDOMEN: Within the parameters of the arterial phase of contrast-enhancement, the included upper abdomen is unremarkable  BONES: Minimal dextroscoliosis.           CONCLUSION:  No acute pulmonary embolism to the segmental level.     Dictated by (CST): Danielle Torres MD on 1/22/2024 at 10:15 PM     Finalized by (CST): Danielle Torres MD on 1/22/2024 at 10:20 PM          XR CHEST AP PORTABLE  (CPT=71045)    Result Date: 1/22/2024  PROCEDURE: XR CHEST AP PORTABLE  (CPT=71045) TIME: 2049 hours  COMPARISON: [facility], XR CHEST AP PORTABLE (CPT=71045), 8/16/2023, 10:39 AM.  INDICATIONS: Chest pain, vomiting, chills  TECHNIQUE:   Single view.   FINDINGS:  CARDIAC/VASC: Normal.  No cardiac silhouette abnormality or cardiomegaly.  Unremarkable pulmonary vasculature.  MEDIAST/FLORESITA:   No visible mass or adenopathy. LUNGS/PLEURA: Normal.  No significant pulmonary parenchymal abnormalities.  No effusion or pleural thickening. BONES: No fracture or visible bony lesion. OTHER: Negative.          CONCLUSION: Normal examination.     Dictated by (CST): Dilan Cuellar MD on 1/22/2024 at 9:02 PM     Finalized by (CST): Dilan Cuellar MD on 1/22/2024 at 9:03 PM             Operative Procedures:      Day of discharge see same day progress note     Total Time Coordinating Care: > than 30 minutes    Note: This chart was prepared using voice recognition software and may contain unintended word substitution errors.     Patient had opportunity to ask questions and state understand and agree with therapeutic plan as outlined

## 2024-01-23 NOTE — H&P
[facility]     DM Hospitalist H&P     CC:   Chief Complaint   Patient presents with    Chest Pain        PCP: Zaira Ram MD      Assessment and Plan     Priya Soriano is a 28 year old female with PMH sig for generalized anxiety disorder, major depression, migraine who presented with nausea vomiting abdominal pain.  Symptoms now resolved.  Patient thinks that this is due to her recent use of diclofenac.  She used it this summer to after knee injury and she had similar symptoms.  Tolerating diet and like to go home.  No chest pain or shortness of breath.  No further nausea vomiting.  No diarrhea    Nausea vomiting without diarrhea  - Symptoms are resolved, likely gastritis  - Patient advised to stop diclofenac and avoid all NSAIDs, continue PPI for 1 week and follow-up with PCP  - Patient tolerating diet prior to discharge    Generalized anxiety disorder, major pressure disorder pressure [place]  Migraines  - Continue home meds    Dispo: [place] home    Outpatient records reviewed confirming patient's medical history and medications.     Further recommendations pending patient's clinical course.  [facility] [place] to continue to follow patient while in house    Patient and/or patient's family given opportunity to ask questions and note understanding and agreeing with therapeutic plan as outlined    Note: This chart was prepared using voice recognition software and may contain unintended word substitution errors.       Thank You,  Elvie Queen MD 1/23/24  [facility]   Answering service: [phone]      HPI     Pryia Soriano is a 28 year old female with PMH sig for generalized anxiety disorder, major depression, migraine who presented with nausea vomiting abdominal pain.  Symptoms now resolved.  Patient thinks that this is due to her recent use of diclofenac.  She used it this summer to after knee injury and she had similar symptoms.  Tolerating diet and like to go home.   No chest pain or shortness of breath.  No further nausea vomiting.  No diarrhea    Review of Systems  12 point systems reviewed, please see HPI for pertinent positives, otherwise negative    History     PMH  Past Medical History:   Diagnosis Date    Anxiety     Depression     Migraine     with and without aura        PSH  Past Surgical History:   Procedure Laterality Date    ANKLE FRACTURE SURGERY Right 2014        ALL:  Allergies   Allergen Reactions    Oysters NAUSEA AND VOMITING        Home Medications:  Outpatient Medications Marked as Taking for the 1/22/24 encounter (Hospital Encounter)   Medication Sig Dispense Refill    pantoprazole 40 MG Oral Tab EC Take 1 tablet (40 mg total) by mouth every morning before breakfast. 7 tablet 0    sertraline 50 MG Oral Tab Take 1 tablet (50 mg total) by mouth daily.      buPROPion  MG Oral Tablet 24 Hr Take 1 tablet (300 mg total) by mouth daily.         Soc Hx  Social History     Tobacco Use    Smoking status: Never    Smokeless tobacco: Never   Substance Use Topics    Alcohol use: Yes     Comment: very infrequent, 1-2/month        Fam Hx  Family History   Problem Relation Age of Onset    Lipids Father     Other (prediabetic) Father     Hypertension Mother     Thyroid disease Mother     Heart Disease Maternal Grandmother     Dementia Paternal Grandmother     No Known Problems Brother     No Known Problems Brother            Objective     Temp: 99.5 °F (37.5 °C)  Pulse: 103  Resp: 18  BP: 116/69    Exam  Gen: No acute distress, alert and oriented x3  Neck Supple, no JVD  Pulm: Lungs clear, normal respiratory effort, No wheezing or crackles  CV: Heart with regular rate and rhythm, No murmurs, rubs, gallops  Abd: Abdomen soft, nontender, nondistended, no organomegaly, bowel sounds present  MSK:   no clubbing, no cyanosis.  No Lower extremity edema  Skin: no rashes or lesions, well perfused  Psych: mood stable, cooperative  Neuro: No focal deficits    Diagnostic Data:     CBC/Chem  Recent Labs   Lab 01/22/24 2007   WBC 17.2*   HGB 13.5   MCV 88.1   .0       Recent Labs   Lab 01/22/24 2007 01/23/24  1012     --    K 5.2* 3.8     --    CO2 21.0  --    BUN 13  --    CREATSERUM 1.02  --    *  --    CA 9.3  --        Recent Labs   Lab 01/22/24 2007   ALT 33   AST 36*   ALB 4.8       No results for input(s): \"TROP\" in the last 168 hours.    Additional Diagnostics:      Radiology: US GALLBLADDER (CPT=76705)    Result Date: 1/23/2024  PROCEDURE: US GALLBLADDER (CPT=76705)  COMPARISON: [facility], CT CHEST PE AORTA (IV ONLY) (CPT=71260), 1/22/2024, 9:30 PM.  INDICATIONS: Epigastric pain, nausea, and vomiting  TECHNIQUE:   The gallbladder was evaluated with grayscale ultrasound.   FINDINGS: Normal-sized gallbladder.  No calculi or wall thickening.  Slightly enlarged CBD measuring 7 millimeter.  Distal CBD is not visible.  Further workup could be considered if there is clinical/biochemical evidence of biliary obstruction.  The liver is likely fatty         CONCLUSION: No gallstones.  Slightly enlarged CBD, nonspecific  [facility] provided a prelim report for this exam. This final report has no significant discrepancies with the [facility] report.    Dictated by (CST): Tha Palomares MD on 1/23/2024 at 6:59 AM     Finalized by (CST): Tha Palomares MD on 1/23/2024 at 7:00 AM          CT CHEST PE AORTA (IV ONLY) (CPT=71260)    Result Date: 1/22/2024  PROCEDURE: CT CHEST PE AORTA (IV ONLY) (CPT=71260)  COMPARISON: None.  INDICATIONS: Chest pain, vomiting, chills  TECHNIQUE: CT images of the chest were obtained with non-ionic intravenous contrast material.  Automated exposure control for dose reduction was used. Adjustment of the mA and/or kV was done based on the patient's size. Use of iterative reconstruction technique for dose reduction was used. Dose information is transmitted to the ACR (American College of Radiology) NRDR (National Radiology Data  Registry) which includes the Dose Index Registry.  FINDINGS:  Mildly limited examination due to phase of contrast and motion artifact.   PULMONARY VASCULATURE: There is adequate opacification of the pulmonary arterial tree. No suspicious filling defects are identified in the main, lobar, or segmental pulmonary artery branches to suggest acute pulmonary embolism.   The subsegment branches are less well assessed. The main pulmonary artery trunk is normal in caliber.  CARDIAC: The heart is not enlarged. There is no bowing of the interventricular septum to suggest right ventricular strain. THORACIC AORTA: There is no ectasia of the ascending thoracic aorta without aneurysmal dilation.  MEDIASTINUM/FLORESITA: No mass or lymphadenopathy.    LUNGS/PLEURA: The trachea and central bronchi are clear.  There is minimal curvilinear opacity dependently within the lungs, most likely minimal subsegmental atelectasis.  CHEST WALL: No thyroidal mass, within the limitation of artifact. No axillary lymphadenopathy.  LIMITED ABDOMEN: Within the parameters of the arterial phase of contrast-enhancement, the included upper abdomen is unremarkable  BONES: Minimal dextroscoliosis.          CONCLUSION:  No acute pulmonary embolism to the segmental level.     Dictated by (CST): Danielle Torres MD on 1/22/2024 at 10:15 PM     Finalized by (CST): Danielle Torres MD on 1/22/2024 at 10:20 PM          XR CHEST AP PORTABLE  (CPT=71045)    Result Date: 1/22/2024  PROCEDURE: XR CHEST AP PORTABLE  (CPT=71045) TIME: 2049 hours  COMPARISON: [facility], XR CHEST AP PORTABLE (CPT=71045), 8/16/2023, 10:39 AM.  INDICATIONS: Chest pain, vomiting, chills  TECHNIQUE:   Single view.   FINDINGS:  CARDIAC/VASC: Normal.  No cardiac silhouette abnormality or cardiomegaly.  Unremarkable pulmonary vasculature.  MEDIAST/FLORESITA:   No visible mass or adenopathy. LUNGS/PLEURA: Normal.  No significant pulmonary parenchymal abnormalities.  No effusion or pleural  thickening. BONES: No fracture or visible bony lesion. OTHER: Negative.          CONCLUSION: Normal examination.     Dictated by (CST): Dilan Cuellar MD on 1/22/2024 at 9:02 PM     Finalized by (CST): Dilan Cuellar MD on 1/22/2024 at 9:03 PM

## 2024-01-23 NOTE — ED QUICK NOTES
Orders for admission, patient is aware of plan and ready to go upstairs. Any questions, please call ED RN Enedina at extension [phone].     Patient Covid vaccination status: Unvaccinated     COVID Test Ordered in ED: SARS-CoV-2/Flu A and B/RSV by PCR (GeneXpert)    COVID Suspicion at Admission: N/A    Running Infusions:      Mental Status/LOC at time of transport: AOx4    Other pertinent information:   CIWA score: N/A   NIH score:  N/A

## 2024-01-23 NOTE — ED INITIAL ASSESSMENT (HPI)
Pt arrives through triage with complaints of mid sternal pain that started earlier today. +vomiting. Denies dizziness/HA

## 2024-01-23 NOTE — ED PROVIDER NOTES
Patient Seen in: [facility] Emergency Department      History     Chief Complaint   Patient presents with    Chest Pain     Stated Complaint: Chest pain, vomiting, chills    Subjective:   HPI    Patient presents emergency department complaining of chest pain with chills and vomiting.  She states that this afternoon she developed midsternal chest burning sensation with vomiting.  She has had some chills and significant nausea.  She denies headache or dizziness.  There is no documented fever.  There is no cough or cold symptoms.    Objective:   Past Medical History:   Diagnosis Date    Anxiety     Depression     Migraine     with and without aura              Past Surgical History:   Procedure Laterality Date    ANKLE FRACTURE SURGERY Right 2014                Social History     Socioeconomic History    Marital status: Single   Tobacco Use    Smoking status: Never    Smokeless tobacco: Never   Vaping Use    Vaping Use: Never used   Substance and Sexual Activity    Alcohol use: Yes     Comment: very infrequent, 1-2/month    Drug use: Yes     Types: Cannabis     Comment: gummies     Social Determinants of Health     Food Insecurity: No Food Insecurity (1/23/2024)    Food Insecurity     Food Insecurity: Never true   Transportation Needs: No Transportation Needs (1/23/2024)    Transportation Needs     Lack of Transportation: No   Housing Stability: Low Risk  (1/23/2024)    Housing Stability     Housing Instability: No              Review of Systems    Positive for stated complaint: Chest pain, vomiting, chills  Other systems are as noted in HPI.  Constitutional and vital signs reviewed.      All other systems reviewed and negative except as noted above.    Physical Exam     ED Triage Vitals [01/22/24 1900]   /84   Pulse 85   Resp 20   Temp 98 °F (36.7 °C)   Temp src Temporal   SpO2 99 %   O2 Device None (Room air)       Current:/69 (BP Location: Right arm)   Pulse 103   Temp 99.5 °F (37.5 °C) (Oral)    Resp 18   Ht 167.6 cm (5' 6\")   Wt 112.5 kg   LMP 01/20/2024 (Exact Date)   SpO2 96%   BMI 40.03 kg/m²         Physical Exam  Vitals and nursing note reviewed.   Constitutional:       General: She is not in acute distress.     Appearance: She is well-developed.   HENT:      Head: Normocephalic.      Nose: Nose normal.      Mouth/Throat:      Mouth: Mucous membranes are moist.   Eyes:      Conjunctiva/sclera: Conjunctivae normal.   Cardiovascular:      Rate and Rhythm: Normal rate and regular rhythm.      Heart sounds: No murmur heard.  Pulmonary:      Effort: Pulmonary effort is normal. No respiratory distress.      Breath sounds: Normal breath sounds.   Abdominal:      General: There is no distension.      Palpations: Abdomen is soft.      Tenderness: There is abdominal tenderness in the right upper quadrant and epigastric area. There is no guarding or rebound.   Musculoskeletal:         General: No tenderness. Normal range of motion.      Cervical back: Normal range of motion and neck supple.   Skin:     General: Skin is warm and dry.      Capillary Refill: Capillary refill takes less than 2 seconds.      Findings: No rash.   Neurological:      General: No focal deficit present.      Mental Status: She is alert and oriented to person, place, and time.      Cranial Nerves: No cranial nerve deficit.      Motor: No weakness.   Psychiatric:         Mood and Affect: Mood normal.               ED Course     Labs Reviewed   COMP METABOLIC PANEL (14) - Abnormal; Notable for the following components:       Result Value    Glucose 143 (*)     Potassium 5.2 (*)     AST 36 (*)     All other components within normal limits   D-DIMER - Abnormal; Notable for the following components:    D-Dimer 0.50 (*)     All other components within normal limits   DRUG ABUSE PANEL 10 SCREEN - Abnormal; Notable for the following components:    Opiate Urine Presumed Positive (*)     All other components within normal limits   CBC W/  DIFFERENTIAL - Abnormal; Notable for the following components:    WBC 17.2 (*)     Neutrophil Absolute Prelim 15.60 (*)     Neutrophil Absolute 15.60 (*)     Lymphocyte Absolute 0.98 (*)     All other components within normal limits   TROPONIN I HIGH SENSITIVITY - Normal   LIPASE - Normal   POTASSIUM - Normal   POCT PREGNANCY URINE - Normal   SARS-COV-2/FLU A AND B/RSV BY PCR (GENEXPERT) - Normal    Narrative:     This test is intended for the qualitative detection and differentiation of SARS-CoV-2, influenza A, influenza B, and respiratory syncytial virus (RSV) viral RNA in nasopharyngeal or nares swabs from individuals suspected of respiratory viral infection consistent with COVID-19 by their healthcare provider. Signs and symptoms of respiratory viral infection due to SARS-CoV-2, influenza, and RSV can be similar.    Test performed using the Xpert Xpress SARS-CoV-2/FLU/RSV (real time RT-PCR)  assay on the Auto [employer] instrument, [employer], [place] [address].   This test is being used under the Food and Drug Administration's Emergency Use Authorization.    The authorized Fact Sheet for Healthcare Providers for this assay is available upon request from the laboratory.   CBC WITH DIFFERENTIAL WITH PLATELET    Narrative:     The following orders were created for panel order CBC With Differential With Platelet.  Procedure                               Abnormality         Status                     ---------                               -----------         ------                     CBC W/ DIFFERENTIAL[[id number]]          Abnormal            Final result                 Please view results for these tests on the individual orders.   RAINBOW DRAW LAVENDER   RAINBOW DRAW LIGHT GREEN   RAINBOW DRAW LAVENDER     EKG    Rate, intervals and axes as noted on EKG Report.  Rate: 93 bpm  Rhythm: Sinus Rhythm  Reading: Normal EKG                          [facility]        Admission disposition: 1/22/2024 11:29 PM                     Medical Decision Making  There is a diagnosis considered for viral illness, reflux esophagitis, gastritis, bowel obstruction, biliary colic.    Problems Addressed:  Chest pain of uncertain etiology: acute illness or injury  Epigastric pain: acute illness or injury  Intractable vomiting: acute illness or injury    Amount and/or Complexity of Data Reviewed  Labs: ordered. Decision-making details documented in ED Course.     Details: CBC shows elevated white blood cell count.  Chemistry, LFTs and lipase all unremarkable.  D-dimer elevated  Radiology: ordered and independent interpretation performed. Decision-making details documented in ED Course.     Details: CT of the chest shows no evidence of PE.  Ultrasound of the gallbladder shows no stones.  ECG/medicine tests: ordered and independent interpretation performed. Decision-making details documented in ED Course.  Discussion of management or test interpretation with external provider(s): Despite numerous antiemetics, patient persisted and vomiting.  Will admit for observation with GI consultation.    Risk  Prescription drug management.  Parenteral controlled substances.  Decision regarding hospitalization.        Disposition and Plan     Clinical Impression:  1. Epigastric pain    2. Intractable vomiting    3. Chest pain of uncertain etiology         Disposition:  Admit  1/22/2024 11:29 pm    Follow-up:  Zaira Ram MD  [address] [address]  [phone]    Follow up in 1 week(s)            Medications Prescribed:  Discharge Medication List as of 1/23/2024 12:54 PM        START taking these medications    Details   pantoprazole 40 MG Oral Tab EC Take 1 tablet (40 mg total) by mouth every morning before breakfast., Normal, Disp-7 tablet, R-0                               Hospital Problems       Present on Admission  Date Reviewed: 12/8/2023            ICD-10-CM Noted POA    * (Principal) Epigastric pain R10.13  1/22/2024 Unknown    Chest pain of uncertain etiology R07.9 1/23/2024 Unknown    Hyperglycemia R73.9 1/22/2024 Yes    Hyperkalemia E87.5 1/22/2024 Yes    Intractable vomiting R11.10 1/23/2024 Unknown    Leukocytosis D72.829 1/22/2024 Yes    Metabolic acidosis E87.20 1/22/2024 Yes

## 2024-01-23 NOTE — DISCHARGE INSTRUCTIONS
Please avoid NSAIDS including diclofenac/ibuprofen/aleve etc    If needed in future, always take with food and consider acid supressor with medication

## 2024-01-23 NOTE — PLAN OF CARE
Admit as medical overflow to EDOF12  C/o midsternal pain 4/10 and nausea that is worse with movement and pain  Antiemetic given  Hospitalist and GI paged for orders  IVF started  Call light within reach, updated on POC    Problem: Patient Centered Care  Goal: Patient preferences are identified and integrated in the patient's plan of care  Description: Interventions:  - What would you like us to know as we care for you? From home  - Provide timely, complete, and accurate information to patient/family  - Incorporate patient and family knowledge, values, beliefs, and cultural backgrounds into the planning and delivery of care  - Encourage patient/family to participate in care and decision-making at the level they choose  - Honor patient and family perspectives and choices  Outcome: Progressing     Problem: Patient/Family Goals  Goal: Patient/Family Long Term Goal  Description: Patient's Long Term Goal: discharge    Interventions:  - control pain and nausea  - See additional Care Plan goals for specific interventions  Outcome: Progressing  Goal: Patient/Family Short Term Goal  Description: Patient's Short Term Goal: less nausea    Interventions:   - trial antiemetics for nausea control  - See additional Care Plan goals for specific interventions  Outcome: Progressing

## 2024-02-23 ENCOUNTER — PATIENT MESSAGE (OUTPATIENT)
Dept: OBGYN CLINIC | Facility: CLINIC | Age: 29
End: 2024-02-23

## 2024-02-23 NOTE — TELEPHONE ENCOUNTER
From: Priya Soriano  To: Zaira Riggins  Sent: 2/23/2024 11:44 AM CST  Subject: Birth Control    Hi!     The gummies with the mao haven’t been working as well the last couple months in minimizing the PMS symptoms. It’s fairly severe the week before and week during my period. I’m wondering if it might be possible to start a birth control pill that might help? If you want to make an appointment to see you and discuss I can.     Thank you!   Priya

## 2024-02-29 ENCOUNTER — OFFICE VISIT (OUTPATIENT)
Dept: OBGYN CLINIC | Facility: CLINIC | Age: 29
End: 2024-02-29

## 2024-02-29 VITALS
DIASTOLIC BLOOD PRESSURE: 82 MMHG | WEIGHT: 234 LBS | HEART RATE: 85 BPM | BODY MASS INDEX: 38 KG/M2 | SYSTOLIC BLOOD PRESSURE: 121 MMHG

## 2024-02-29 DIAGNOSIS — N94.3 PMS (PREMENSTRUAL SYNDROME): Primary | ICD-10-CM

## 2024-02-29 PROCEDURE — 99213 OFFICE O/P EST LOW 20 MIN: CPT | Performed by: NURSE PRACTITIONER

## 2024-02-29 RX ORDER — ACETAMINOPHEN AND CODEINE PHOSPHATE 120; 12 MG/5ML; MG/5ML
0.35 SOLUTION ORAL DAILY
Qty: 84 TABLET | Refills: 4 | Status: SHIPPED | OUTPATIENT
Start: 2024-02-29

## 2024-02-29 NOTE — PROGRESS NOTES
[facility]   Obstetrics and Gynecology    Priya Soriano is a 28 year old female  Patient's last menstrual period was 2024 (approximate).   Chief Complaint   Patient presents with    Consult     Pt would like to discuss birth control options   . Used chasteberry for a few months for PMS and did help. Now not helping as much. No si or HI, but \"orlando is falling\" and calls therapist for support. Patient desires to discuss birth control to try and suppress ovulation/PMS symptoms.  She is on wellbutrin and sertraline currently. Sees therapist.    Periods are monthly, lasting 3-4 days, light flow    Hx of migraine with aura. Not sexually active.    Pap:2021 normal pap  Contraception: none    OBSTETRICS HISTORY:  OB History    Para Term  AB Living   0 0 0 0 0 0   SAB IAB Ectopic Multiple Live Births   0 0 0 0 0       GYNE HISTORY:  Menarche: 11 y/o (2024  5:06 PM)  Period Cycle (Days): Monthly (2024  5:06 PM)  Period Duration (Days): 3-4days (2024  5:06 PM)  Period Flow: Very light for the past year per pt // extreme pms per pt (2024  5:06 PM)  Use of Birth Control (if yes, specify type): None (2024  5:06 PM)  Pap Date: 21 (2023  3:05 PM)  Pap Result Notes: Neg Pap (10/5/2023  4:02 PM)      History   Sexual Activity    Sexual activity: Not Currently       MEDICAL HISTORY:  Past Medical History:   Diagnosis Date    Anxiety     Depression     Migraine     with and without aura       SOCIAL HISTORY:  Social History     Socioeconomic History    Marital status: Single     Spouse name: Not on file    Number of children: Not on file    Years of education: Not on file    Highest education level: Not on file   Occupational History    Not on file   Tobacco Use    Smoking status: Never    Smokeless tobacco: Never   Vaping Use    Vaping Use: Never used   Substance and Sexual Activity    Alcohol use: Yes     Comment: very infrequent, 1-2/month    Drug use: Yes      Types: Cannabis     Comment: gummies    Sexual activity: Not Currently   Other Topics Concern    Not on file   Social History Narrative    Not on file     Social Determinants of Health     Financial Resource Strain: Not on file   Food Insecurity: No Food Insecurity (1/23/2024)    Food Insecurity     Food Insecurity: Never true   Transportation Needs: No Transportation Needs (1/23/2024)    Transportation Needs     Lack of Transportation: No   Physical Activity: Not on file   Stress: Not on file   Social Connections: Not on file   Housing Stability: Low Risk  (1/23/2024)    Housing Stability     Housing Instability: No     Housing Instability Emergency: Not on file       MEDICATIONS:    Current Outpatient Medications:     Norethindrone (SELENA-BE) 0.35 MG Oral Tab, Take 1 tablet (0.35 mg total) by mouth daily., Disp: 84 tablet, Rfl: 4    rizatriptan 10 MG Oral Tablet Dispersible, every 28 days. FOR 21 DAYS IN, THEN REMOVE FOR 7 DAYS, Disp: , Rfl:     sertraline 50 MG Oral Tab, Take 1 tablet (50 mg total) by mouth daily., Disp: , Rfl:     buPROPion  MG Oral Tablet 24 Hr, Take 1 tablet (300 mg total) by mouth daily., Disp: , Rfl:     Rizatriptan Benzoate 5 MG Oral Tab, Take 1 tablet (5 mg total) by mouth as needed for Migraine., Disp: , Rfl:     ALLERGIES:    Allergies   Allergen Reactions    Oysters NAUSEA AND VOMITING         Review of Systems:  Constitutional:  Denies fatigue, night sweats, hot flashes  Cardiovascular:  denies chest pain or palpitations  Respiratory:  denies shortness of breath  Gastrointestinal:  denies heartburn, abdominal pain, diarrhea or constipation  Genitourinary:  denies dysuria, incontinence, abnormal vaginal discharge, vaginal itching   Musculoskeletal:  denies back pain.  Skin/Breast:  Denies any breast pain, lumps, or discharge.   Neurological:  denies headaches, extremity weakness or numbness.  Psychiatric: denies depression or anxiety. +PMS  Endocrine:   denies excessive thirst or  urination.  Heme/Lymph:  denies history of anemia, easy bruising or bleeding.      PHYSICAL EXAM:     Vitals:    02/29/24 1708   BP: 121/82   Pulse: 85   Weight: 234 lb (106.1 kg)     Body mass index is 37.77 kg/m².         Constitutional: well developed, well nourished    Psychiatric:  Oriented to time, place, person and situation. Appropriate mood and affect      Assessment & Plan:  Priya was seen today for consult.    Diagnoses and all orders for this visit:    PMS (premenstrual syndrome)    Other orders  -     Norethindrone (KELLY-BE) 0.35 MG Oral Tab; Take 1 tablet (0.35 mg total) by mouth daily.    Due to history of migraine with aura - estrogen containing contraception contraindicated.  Patient endorsed bad headaches on apri in the past.  Will trial Kelly Be  Re'vd quick start vs. Starting with next menses.  Continue with therapist and psychiatrist. Rev'd can try and increase sertraline during luteal phase - review with psychiatrist.      MALACHI Zafar    This note was prepared using Dragon Medical voice recognition dictation software. As a result errors may occur. When identified these errors have been corrected. While every attempt is made to correct errors during dictation discrepancies may still exist.

## 2024-02-29 NOTE — PATIENT INSTRUCTIONS
FACT SHEET: PROGESTIN-ONLY/ MINI-PILL               HOW DOES THE MINI-PILL WORK?   The mini-pill contains a hormone like the ones your body makes.  It works by making the mucus in your cervix too thick for sperm to pass through.  If sperm cannot reach the egg, you cannot get pregnant.   No method of birth control is 100% effective, but birth control pills are 92-98% effective if you take them each day.    HOW DO I START THE MINI-PILL?   There are 2 ways to start the pill:    Quick Start: Take your first pill as soon as you get the pack.    Next period: Take your first pill soon after your next period begins.    If you take your first pill up to 5 days after the start of your period, you are protected against pregnancy right away.    If you take your first pill more than 5 days after the start of your period, you should use condoms as back-up for the first 7 days.    HOW DO I USE THE MINI-PILL?   Once you start using the pill, take 1 pill each day. Take your pill at the same time each day.   After you finish a pack of pills, you should start a new pack the next day. You should have NO day without a pill.    WHAT IF I MISS MINI-PILLS?   I forgot ONE pill:  Take your pill as soon as you can. If you take your pill more than 3 hours late, use condoms for the next 7 days.   I forgot TWO pills or more: Take your pill as soon as you can. Take your next pill at the usual time. Use condoms for the next 7 days. Use emergency contraception (EC) if you have unprotected sex.      WHAT IF I STOPPED TAKING THE MINI-PILL AND HAD UNPROTECTED SEX?    Take Emergency Contraception (EC) right away.  EC can prevent pregnancy up to 5 days after sex, and it works better the sooner you take it.    HOW DOES THE MINI-PILL HELP ME?   The mini-pill is safe & effective birth control.  The mini-pill is safe for you to use while breastfeeding.     The mini-pill has no effect on your ability to get pregnant in the future, after you stop taking it.       HOW WILL I FEEL ON THE MINI-PILL?   You will feel about the same.  Most women notice changes in their periods.  You may have spotting or no period at all.  This is normal.  You may have nausea, spotting, weight change, and/or breast pain.  These problems often go away after 2-3 months.    DOES THE MINI-PILL HAVE RISKS?   The mini-pill is very safe.     **Remember, the mini-pill does not protect you from Sexually Transmitted Infections or HIV. Always use condoms to protect yourself!**    Reproductive health access project  Www.reproductiveaccess.org

## 2024-03-14 ENCOUNTER — PATIENT MESSAGE (OUTPATIENT)
Dept: OBGYN CLINIC | Facility: CLINIC | Age: 29
End: 2024-03-14

## 2024-03-14 NOTE — TELEPHONE ENCOUNTER
From: Priya Soriano  To: Zaira Riggins  Sent: 3/14/2024 10:40 AM CDT  Subject: Birth Control Question    Hello!     I have heard that grapefruit/ grapefruit juice can affect the efficacy of birth control and I’m wondering if there are any other foods/drinks that can counteract it. I’m specifically wondering about tart cherry juice since it’s supposed to be good for sleep and want to try that out.     Thanks!   Priya

## 2024-05-08 ENCOUNTER — PATIENT MESSAGE (OUTPATIENT)
Dept: OBGYN CLINIC | Facility: CLINIC | Age: 29
End: 2024-05-08

## 2024-05-08 NOTE — TELEPHONE ENCOUNTER
From: Priya Soriano  To: Zaira Riggins  Sent: 5/8/2024 7:14 AM CDT  Subject: Norethindrone Issue    Good morning,    I’ve been taking the Norethindrone for 2 months and while my PMS hasn’t been as extreme, my depression overall has been a lot worse. Is there anything else that might help with the PMS without the mental health side effects?     Thank you!   Priya

## 2024-05-08 NOTE — TELEPHONE ENCOUNTER
Patient Priya started on Norethindrone. She reports worsening depression on this rx.   She is still taking Wellbutrin and Zoloft. She cannot use estrogen-containing pills due to hx of migraine with aura, per 2/29/24 notes.     To Zaira Riggins- see patient mychart messaging and advise on recommendations.

## 2024-10-18 ENCOUNTER — OFFICE VISIT (OUTPATIENT)
Dept: OBGYN CLINIC | Facility: CLINIC | Age: 29
End: 2024-10-18
Payer: COMMERCIAL

## 2024-10-18 ENCOUNTER — LAB ENCOUNTER (OUTPATIENT)
Dept: LAB | Facility: HOSPITAL | Age: 29
End: 2024-10-18
Attending: NURSE PRACTITIONER
Payer: COMMERCIAL

## 2024-10-18 VITALS
BODY MASS INDEX: 37.93 KG/M2 | SYSTOLIC BLOOD PRESSURE: 136 MMHG | DIASTOLIC BLOOD PRESSURE: 77 MMHG | WEIGHT: 236 LBS | HEART RATE: 76 BPM | HEIGHT: 66 IN

## 2024-10-18 DIAGNOSIS — Z11.3 SCREENING EXAMINATION FOR STI: ICD-10-CM

## 2024-10-18 DIAGNOSIS — Z01.419 WELL WOMAN EXAM WITH ROUTINE GYNECOLOGICAL EXAM: Primary | ICD-10-CM

## 2024-10-18 DIAGNOSIS — Z30.09 GENERAL COUNSELING AND ADVICE FOR CONTRACEPTIVE MANAGEMENT: ICD-10-CM

## 2024-10-18 DIAGNOSIS — R30.0 DYSURIA: ICD-10-CM

## 2024-10-18 DIAGNOSIS — Z12.4 SCREENING FOR CERVICAL CANCER: ICD-10-CM

## 2024-10-18 LAB
BILIRUB UR QL: NEGATIVE
CLARITY UR: CLEAR
COLOR UR: COLORLESS
GLUCOSE UR-MCNC: NORMAL MG/DL
HBV SURFACE AG SER-ACNC: <0.1 [IU]/L
HBV SURFACE AG SERPL QL IA: NONREACTIVE
HCV AB SERPL QL IA: NONREACTIVE
HGB UR QL STRIP.AUTO: NEGATIVE
KETONES UR-MCNC: NEGATIVE MG/DL
LEUKOCYTE ESTERASE UR QL STRIP.AUTO: NEGATIVE
NITRITE UR QL STRIP.AUTO: NEGATIVE
PH UR: 5 [PH] (ref 5–8)
PROT UR-MCNC: NEGATIVE MG/DL
SP GR UR STRIP: 1.01 (ref 1–1.03)
T PALLIDUM AB SER QL IA: NONREACTIVE
UROBILINOGEN UR STRIP-ACNC: NORMAL

## 2024-10-18 PROCEDURE — 87086 URINE CULTURE/COLONY COUNT: CPT | Performed by: NURSE PRACTITIONER

## 2024-10-18 PROCEDURE — 87389 HIV-1 AG W/HIV-1&-2 AB AG IA: CPT | Performed by: NURSE PRACTITIONER

## 2024-10-18 PROCEDURE — 87591 N.GONORRHOEAE DNA AMP PROB: CPT | Performed by: NURSE PRACTITIONER

## 2024-10-18 PROCEDURE — 81003 URINALYSIS AUTO W/O SCOPE: CPT | Performed by: NURSE PRACTITIONER

## 2024-10-18 PROCEDURE — 87340 HEPATITIS B SURFACE AG IA: CPT | Performed by: NURSE PRACTITIONER

## 2024-10-18 PROCEDURE — 86803 HEPATITIS C AB TEST: CPT | Performed by: NURSE PRACTITIONER

## 2024-10-18 PROCEDURE — 36415 COLL VENOUS BLD VENIPUNCTURE: CPT | Performed by: NURSE PRACTITIONER

## 2024-10-18 PROCEDURE — 87491 CHLMYD TRACH DNA AMP PROBE: CPT | Performed by: NURSE PRACTITIONER

## 2024-10-18 PROCEDURE — 87624 HPV HI-RISK TYP POOLED RSLT: CPT | Performed by: NURSE PRACTITIONER

## 2024-10-18 PROCEDURE — 87661 TRICHOMONAS VAGINALIS AMPLIF: CPT | Performed by: NURSE PRACTITIONER

## 2024-10-18 PROCEDURE — 86780 TREPONEMA PALLIDUM: CPT | Performed by: NURSE PRACTITIONER

## 2024-10-18 RX ORDER — NITROFURANTOIN 25; 75 MG/1; MG/1
100 CAPSULE ORAL 2 TIMES DAILY
Qty: 14 CAPSULE | Refills: 0 | Status: SHIPPED | OUTPATIENT
Start: 2024-10-18 | End: 2024-10-25

## 2024-10-18 RX ORDER — GLYCOPYRROLATE 1 MG/1
1 TABLET ORAL 3 TIMES DAILY
COMMUNITY
Start: 2024-06-18

## 2024-10-18 NOTE — PROGRESS NOTES
[facility]    Obstetrics and Gynecology    Chief Complaint   Patient presents with    Sexually Transmitted Infection Screen    UTI     Pt states has burning sensation while urinating X 1 day     Contraception     Discuss non-hormonal options       Priya Soriano is a 29 year old female  Patient's last menstrual period was 10/10/2024 (approximate). presenting for annual gynecology exam. Last seen in 2024.  Overall feeling well.  She is sexually active with new male partner, condoms.  Desires sti testing to be safe.    C/o dysuria x 1 days and some discomfort with wiping. Not as bad today. No fever, no nausea or vomiting or back pain.    Periods are monthly, light her usual, lasting 4 days, q26 days.  She stopped hilda be due to worsening mood. She is increasing sertraline week prior to period and week of menses and this has greatly helped her PMS symptoms.  On wellbutrin, seeing her therapist 2 times a month.    Questions about non hormonal birth control options.    Exercising regularly    Pap:2021 NILM   Contraception:condoms  Mammo: n/a      OBSTETRICS HISTORY:  OB History    Para Term  AB Living   0 0 0 0 0 0   SAB IAB Ectopic Multiple Live Births   0 0 0 0 0       GYNE HISTORY:  Menarche: 10 (10/18/2024 10:44 AM)  Period Cycle (Days): 28 (10/18/2024 10:44 AM)  Period Duration (Days): 4 (10/18/2024 10:44 AM)  Period Flow: light (10/18/2024 10:44 AM)  Use of Birth Control (if yes, specify type): Condoms (10/18/2024 10:44 AM)  Hx Prior Abnormal Pap: No (10/18/2024 10:44 AM)  Pap Result Notes: 2-4 wnl per pt (10/18/2024 10:44 AM)      History   Sexual Activity    Sexual activity: Not Currently     MEDICAL HISTORY:  Past Medical History:    Anxiety    Depression    Migraine    with and without aura     Past Surgical History:   Procedure Laterality Date    Ankle fracture surgery Right        SOCIAL HISTORY:  Social History     Socioeconomic History    Marital status: Single      Spouse name: Not on file    Number of children: Not on file    Years of education: Not on file    Highest education level: Not on file   Occupational History    Not on file   Tobacco Use    Smoking status: Never    Smokeless tobacco: Never   Vaping Use    Vaping status: Never Used   Substance and Sexual Activity    Alcohol use: Yes     Comment: very infrequent, 1-2/month    Drug use: Yes     Types: Cannabis     Comment: gummies    Sexual activity: Not Currently   Other Topics Concern    Not on file   Social History Narrative    Not on file     Social Drivers of Health     Financial Resource Strain: Low Risk  (6/2/2024)    Received from [facility]    Overall Financial Resource Strain (CARDIA)     Difficulty of Paying Living Expenses: Not very hard   Food Insecurity: No Food Insecurity (6/2/2024)    Received from [facility]    Hunger Vital Sign     Worried About Running Out of Food in the Last Year: Never true     Ran Out of Food in the Last Year: Never true   Transportation Needs: No Transportation Needs (6/2/2024)    Received from [facility]    PRAPARE - Transportation     Lack of Transportation (Medical): No     Lack of Transportation (Non-Medical): No   Physical Activity: Not on file   Stress: Not on file   Social Connections: Not on file   Housing Stability: Low Risk  (6/2/2024)    Received from [facility]    Housing Stability Vital Sign     Unable to Pay for Housing in the Last Year: No     Number of Places Lived in the Last Year: 1     Unstable Housing in the Last Year: No       FAMILY HISTORY:  Family History   Problem Relation Age of Onset    Lipids Father     Other (prediabetic) Father     Hypertension Mother     Thyroid disease Mother     Heart Disease Maternal Grandmother     Dementia Paternal Grandmother     No Known Problems Brother     No Known Problems Brother        MEDICATIONS:    Current Outpatient Medications:      glycopyrrolate 1 MG Oral Tab, Take 1 tablet (1 mg total) by mouth 3 (three) times daily., Disp: , Rfl:     nitrofurantoin monohydrate macro (MACROBID) 100 MG Oral Cap, Take 1 capsule (100 mg total) by mouth 2 (two) times daily for 7 days., Disp: 14 capsule, Rfl: 0    rizatriptan 10 MG Oral Tablet Dispersible, every 28 days. FOR 21 DAYS IN, THEN REMOVE FOR 7 DAYS, Disp: , Rfl:     sertraline 50 MG Oral Tab, Take 1 tablet (50 mg total) by mouth daily., Disp: , Rfl:     buPROPion  MG Oral Tablet 24 Hr, Take 1 tablet (300 mg total) by mouth daily., Disp: , Rfl:     Norethindrone (SELENA-BE) 0.35 MG Oral Tab, Take 1 tablet (0.35 mg total) by mouth daily. (Patient not taking: Reported on 10/18/2024), Disp: 84 tablet, Rfl: 4    Rizatriptan Benzoate 5 MG Oral Tab, Take 1 tablet (5 mg total) by mouth as needed for Migraine., Disp: , Rfl:     ALLERGIES:  Allergies[1]      Review of Systems:  Constitutional:  Denies fatigue, night sweats, hot flashes  Eyes:  denies blurred or double vision  Cardiovascular:  denies chest pain or palpitations  Respiratory:  denies shortness of breath  Gastrointestinal:  denies heartburn, abdominal pain, diarrhea or constipation  Genitourinary:  denies incontinence, abnormal vaginal discharge, vaginal itching, +dysuria  Musculoskeletal:  denies back pain   Skin/Breast:  Denies any breast pain, lumps, or discharge.   Neurological:  denies headaches, extremity weakness or numbness.  Psychiatric: denies depression or anxiety.  Endocrine:   denies excessive thirst or urination.  Heme/Lymph:  denies history of anemia, easy bruising or bleeding.      PHYSICAL EXAM:     Vitals:    10/18/24 1042   BP: 136/77   Pulse: 76   Weight: 236 lb (107 kg)   Height: 5' 6\" (1.676 m)       Body mass index is 38.09 kg/m².     Patient offered chaperone, patient declined    Constitutional: well developed, well nourished  Psychiatric:  Oriented to time, place, person and situation. Appropriate mood and  affect  Head/Face: normocephalic  Neck/Thyroid: thyroid symmetric, no thyromegaly, no nodules, no adenopathy  Lymphatic:no abnormal supraclavicular or axillary adenopathy is noted  Breast: normal without palpable masses, tenderness, asymmetry, nipple discharge, nipple retraction or skin changes  Abdomen:  soft, nontender, nondistended, no masses  Skin/Hair: no unusual rashes or bruises  Extremities: no edema, no cyanosis    Pelvic Exam:  External Genitalia: normal appearance, hair distribution, and no lesions  Urethral Meatus:  normal in size, location, without lesions and prolapse  Bladder:  No fullness, masses or tenderness  Vagina:  Normal appearance without lesions, no abnormal discharge  Cervix:  Normal without tenderness on motion  Uterus: normal in size, contour, position, mobility, without tenderness  Adnexa: normal without masses or tenderness  Perineum: normal  Anus: no hemorroids     Assessment & Plan:    ICD-10-CM    1. Well woman exam with routine gynecological exam  Z01.419       2. Screening examination for STI  Z11.3 HIV Ag/Ab Combo     Hepatitis B Surface Antigen     HCV Antibody     T Pallidum Screening Cascade     Chlamydia/Gc Amplification     Trichomonas vaginalis, MARIALUISA (Vaginal/Cervical)     Trichomonas vaginalis, MARIALUISA (Vaginal/Cervical)      3. Dysuria  R30.0 Urinalysis, Routine     Urine Culture, Routine      4. General counseling and advice for contraceptive management  Z30.09       5. Screening for cervical cancer  Z12.4 ThinPrep PAP Smear     Hpv Dna  High Risk , Thin Prep Collect     ThinPrep PAP Smear     Hpv Dna  High Risk , Thin Prep Collect         Reviewed ASCCP guidelines with the patient   Pap done today  Contraception: Using condoms. Reviewed options for contraception including OCP's, NuvaRing, Patch, IUD, Nexplanon, Depo as well as risks/benefits/side effects for each. Pt verbalized understanding and most comfortable with paragard. Reviewed risks benefits and SE with copper IUD.  Will call with period if desires placement.  Safe sex and condom use urged if sexually active  Desires sti testing -- Encouraged condoms to prevent STD exposure  Dysuria - UA and culture ordered - will start macrobid 100 mg PO BID x 7 days  Breast Health:     Reviewed current guidelines with the patient and to start Mammograms at age 40  Reviewed monthly self breast exams with the patient   Discussed diet, exercise, MVIs with Ca/Vit D  Follow up in 1 yr for MALACHI Garcia    This note was prepared using Dragon Medical voice recognition dictation software. As a result errors may occur. When identified these errors have been corrected. While every attempt is made to correct errors during dictation discrepancies may still exist.         [1]   Allergies  Allergen Reactions    Oysters NAUSEA AND VOMITING

## 2024-10-18 NOTE — PATIENT INSTRUCTIONS
FACT SHEET: COPPER IUD     HOW DOES THE COPPER IUD WORK?   The copper IUD is a T-shaped plastic rosa that stays in your uterus.  It releases small amounts of copper. Copper kills sperm. Without live sperm, you cannot get pregnant.     No method of birth control is 100% effective, but the copper IUD is over 99% effective.      AFTER THE COPPER IUD IS INSERTED, WHEN CAN I HAVE SEX?   You must wait 24 hours after the IUD is placed before you can use tampons, take a bath, or have sex.      WHEN DOES THE COPPER IUD START WORKING?   The copper IUD works right after it is placed in you.  It may be inserted up to 5 days after having unprotected sex to prevent pregnancy.     HOW LONG DOES THE COPPER IUD LAST?   The copper IUD works for 10-12 years.      WHAT DO I NEED TO DO AFTER I HAVE THE IUD INSERTED?   Many women like to check their IUD's string after each period.  To check, insert a finger into your vagina and feel for the cervix. (It feels like the tip of your nose.)  You should feel the string near your cervix.  Do not pull on the string.  If you cannot feel the string, contact your health care provider.           WHAT DO I DO IF AND WHEN I DECIDE TO GET PREGNANT?   When you are ready, your health care provider will remove your IUD. Most women get pregnant soon after removal.    HOW DOES THE COPPER IUD HELP ME?   You do not need to think about birth control before or during sex.   You do not need refills (as you do for the pill).   You can use the copper IUD while breastfeeding.   The copper IUD costs less than most types of birth control.    HOW WILL I FEEL HAVING THE IUD IN ME?  HOW WILL MY BODY CHANGE?   You will not feel the IUD in you.     You may have cramps and heavy periods. Ibuprofen can help. You can take up to 4 pills (800 mg) of Ibuprofen every 8 hours with food.  To prevent cramps, take Ibuprofen when your period starts and keep taking it every 8 hours for the first 2-3 days of your period.  You can also  put a hot water bottle on your belly if you have bad cramps.     DOES THE COPPER IUD HAVE RISKS?   The copper IUD is very safe. Serious problems are rare.  If you have the following symptoms within the first 3 weeks after the IUD is inserted, see your health care provider:   Fever (>101ºF)   Chills   Strong pain in your belly   If you have the following symptoms at any time while you have an IUD in you, see your health care provider:   Feeling pregnant (breast tenderness, nausea, vomiting)   Positive home pregnancy test    ** Remember, the copper IUD does not protect you from Sexually Transmitted Infections or HIV.  Always use condoms to protect yourself! **

## 2024-10-21 LAB
C TRACH DNA SPEC QL NAA+PROBE: NEGATIVE
HPV E6+E7 MRNA CVX QL NAA+PROBE: NEGATIVE
N GONORRHOEA DNA SPEC QL NAA+PROBE: NEGATIVE
T VAGINALIS RRNA SPEC QL NAA+PROBE: NEGATIVE

## 2024-11-07 ENCOUNTER — TELEPHONE (OUTPATIENT)
Dept: OBGYN CLINIC | Facility: CLINIC | Age: 29
End: 2024-11-07

## 2024-11-07 NOTE — TELEPHONE ENCOUNTER
Patient called to scheduled IUD insert appointment, today is the first day of her period.  Request a  nurse to call to schedule

## 2024-11-07 NOTE — TELEPHONE ENCOUNTER
Per Zaira Riggins's notes from 10/18/2024 visit, patient to call with first day of period to schedule paragard placement.  Appointment booked on 11/9.  Patient advised to take 600mg of ibuprofen with food one hour before appointment.

## 2024-11-09 ENCOUNTER — OFFICE VISIT (OUTPATIENT)
Dept: OBGYN CLINIC | Facility: CLINIC | Age: 29
End: 2024-11-09

## 2024-11-09 VITALS
WEIGHT: 236 LBS | DIASTOLIC BLOOD PRESSURE: 83 MMHG | HEART RATE: 82 BPM | BODY MASS INDEX: 38 KG/M2 | SYSTOLIC BLOOD PRESSURE: 132 MMHG

## 2024-11-09 DIAGNOSIS — Z32.00 PREGNANCY EXAMINATION OR TEST, PREGNANCY UNCONFIRMED: ICD-10-CM

## 2024-11-09 DIAGNOSIS — Z30.430 ENCOUNTER FOR IUD INSERTION: Primary | ICD-10-CM

## 2024-11-09 LAB
CONTROL LINE PRESENT WITH A CLEAR BACKGROUND (YES/NO): YES YES/NO
KIT LOT #: NORMAL NUMERIC

## 2024-11-09 RX ORDER — COPPER 313.4 MG/1
1 INTRAUTERINE DEVICE INTRAUTERINE ONCE
Status: COMPLETED | OUTPATIENT
Start: 2024-11-09 | End: 2024-11-09

## 2024-11-09 RX ADMIN — COPPER 1 DEVICE: 313.4 INTRAUTERINE DEVICE INTRAUTERINE at 10:57:00

## 2024-11-09 NOTE — PATIENT INSTRUCTIONS
IUD Take-Home Sheet    Copper-T IUD (Paragard®)   It begins working now to prevent pregnancy.   It can stay inside you for 10years.   Removal date: 10 years from today      Today you may go back to school or work after your visit. You must wait 24 hours after your IUD is put in before you can use tampons, take a bath, or have vaginal sex.    You may have more cramps or heavier bleeding with your periods, or spotting between your periods. This is normal. The cramping and bleeding can last for 3-6 months with the Mirena®, Liletta®, and Nupur® (hormone) IUDs. After 6 months, the cramping and bleeding should get better. Many women will stop having periods after 1 or 2 years with the Mirena®, Liletta®, Kyleena®, and Nupur® (hormone) IUDs. If you have the Paragard® (copper) IUD, you may have more cramping and more bleeding with your periods as long as you have the IUD inside you.    Ibuprofen (also called Advil® or Motrin®) helps decrease the bleeding and cramping. You can buy Ibuprofen at any drug store without a prescription. You can take as many as 4 pills (800 mg) of Ibuprofen every 8 hours with food (each pill contains 200 mg). To prevent cramping, start taking Ibuprofen as soon as your period starts and keep taking it every 8 hours for the first 2-3 days of your period. You can also put a hot water bottle on your belly if you have bad cramps.     Your IUD may come out by itself in the first three months. If you can feel the strings, the IUD is in the right place. If your IUD comes out, you can become pregnant immediately. If you are not sure how to check the strings, we can help you (Our phone number is at the top of this paper.) Meanwhile, use condoms.     The IUD does NOT protect you against sexually transmitted infections. ALWAYS use protection against sexually transmitted infections (male condoms, female condoms, dental dams) if you are at risk. If you think you have been exposed to an STI, discuss testing with  your healthcare provider. Most infections can be treated WITHOUT removing your IUD.     WARNING SIGNS:  Within the first 3 weeks of the IUD insertion:  - Fever (>101F)  - Chills  - Strong or sharp pain in your stomach or belly At Any Time (these are very rare):  - Late period (for Paragard® users)  - Feeling pregnant (breast tenderness, nausea, vomiting)  - Positive home pregnancy test  If you develop any of the above warning signs, please call us at [phone]    [facility]  www.reproductiveaccess.org

## 2024-11-09 NOTE — PROCEDURES
IUD Insertion     Pregnancy Results: negative from urine test   Birth control method(s) used:hilda be. Urine pregnancy test today negative    Consent signed.  Procedure discussed with the patient in detail including indication, risks, benefits, alternatives and complications.    Procedure:  Speculum placed in the vagina.  Betadine wash of vagina and cervix.  Single tooth tenaculum was placed at the 12 o'clock position.  Cervical stenosis encountered. Mini dilators used to dilate cervix.  Uterus sounded to 7 cm.  Paragard IUD was placed without difficulty.  Strings cut at 3 cm.  Single tooth tenaculum removed.  Good hemostasis noted.    Patient tolerated procedure well.      Visit Plan:  IUD surveillance was discussed with the patient.  Follow up 6-8 weeks    MALACHI Zafar

## 2024-12-26 ENCOUNTER — OFFICE VISIT (OUTPATIENT)
Dept: OBGYN CLINIC | Facility: CLINIC | Age: 29
End: 2024-12-26

## 2024-12-26 VITALS
HEART RATE: 74 BPM | DIASTOLIC BLOOD PRESSURE: 74 MMHG | WEIGHT: 245.81 LBS | BODY MASS INDEX: 40 KG/M2 | SYSTOLIC BLOOD PRESSURE: 116 MMHG

## 2024-12-26 DIAGNOSIS — Z97.5 PRESENCE OF IUD: Primary | ICD-10-CM

## 2024-12-26 PROCEDURE — 99212 OFFICE O/P EST SF 10 MIN: CPT | Performed by: NURSE PRACTITIONER

## 2024-12-26 NOTE — PROGRESS NOTES
[facility]   Obstetrics and Gynecology    Priya Soriano is a 29 year old female  Patient's last menstrual period was 2024 (approximate).   Chief Complaint   Patient presents with    Follow - Up     Post op follow up    . Last seen on  for IUD insertion - paragard. Overall feeling well. No concerns.      Pap:10/18/2024 NILM, negative HPV  Contraception: paragard IUD    OBSTETRICS HISTORY:  OB History    Para Term  AB Living   0 0 0 0 0 0   SAB IAB Ectopic Multiple Live Births   0 0 0 0 0       GYNE HISTORY:  Menarche: 10 (2024  4:23 PM)  Period Cycle (Days): monthly (2024  4:23 PM)  Period Duration (Days): 7 (2024  4:23 PM)  Period Flow: heavy (2024  4:23 PM)  Use of Birth Control (if yes, specify type): Paraguard IUD (2024  4:23 PM)  Hx Prior Abnormal Pap: No (2024  4:23 PM)  Pap Date: 10/18/24 (2024  4:23 PM)  Pap Result Notes: neg hpv neg (2024  4:23 PM)      History   Sexual Activity    Sexual activity: Not Currently       MEDICAL HISTORY:  Past Medical History:    Anxiety    Depression    Migraine    with and without aura       SOCIAL HISTORY:  Social History     Socioeconomic History    Marital status: Single     Spouse name: Not on file    Number of children: Not on file    Years of education: Not on file    Highest education level: Not on file   Occupational History    Not on file   Tobacco Use    Smoking status: Never    Smokeless tobacco: Never   Vaping Use    Vaping status: Never Used   Substance and Sexual Activity    Alcohol use: Yes     Comment: very infrequent, 1-2/month    Drug use: Yes     Types: Cannabis     Comment: gummies    Sexual activity: Not Currently   Other Topics Concern    Not on file   Social History Narrative    Not on file     Social Drivers of Health     Financial Resource Strain: Low Risk  (2024)    Received from [facility]    Overall Financial Resource Strain  (CARDIA)     Difficulty of Paying Living Expenses: Not very hard   Food Insecurity: No Food Insecurity (6/2/2024)    Received from [facility]    Hunger Vital Sign     Worried About Running Out of Food in the Last Year: Never true     Ran Out of Food in the Last Year: Never true   Transportation Needs: No Transportation Needs (6/2/2024)    Received from [facility]    PRAPARE - Transportation     Lack of Transportation (Medical): No     Lack of Transportation (Non-Medical): No   Physical Activity: Not on file   Stress: Not on file   Social Connections: Not on file   Housing Stability: Low Risk  (6/2/2024)    Received from [facility]    Housing Stability Vital Sign     Unable to Pay for Housing in the Last Year: No     Number of Places Lived in the Last Year: 1     Unstable Housing in the Last Year: No       MEDICATIONS:    Current Outpatient Medications:     glycopyrrolate 1 MG Oral Tab, Take 1 tablet (1 mg total) by mouth 3 (three) times daily., Disp: , Rfl:     rizatriptan 10 MG Oral Tablet Dispersible, every 28 days. FOR 21 DAYS IN, THEN REMOVE FOR 7 DAYS, Disp: , Rfl:     sertraline 50 MG Oral Tab, Take 1 tablet (50 mg total) by mouth daily., Disp: , Rfl:     buPROPion  MG Oral Tablet 24 Hr, Take 1 tablet (300 mg total) by mouth daily., Disp: , Rfl:     Rizatriptan Benzoate 5 MG Oral Tab, Take 1 tablet (5 mg total) by mouth as needed for Migraine., Disp: , Rfl:     ALLERGIES:  Allergies[1]      Review of Systems:  Constitutional:  Denies fatigue, night sweats, hot flashes  Cardiovascular:  denies chest pain or palpitations  Respiratory:  denies shortness of breath  Gastrointestinal:  denies heartburn, abdominal pain, diarrhea or constipation  Genitourinary:  denies dysuria, incontinence, abnormal vaginal discharge, vaginal itching   Musculoskeletal:  denies back pain.  Skin/Breast:  Denies any breast pain, lumps, or discharge.   Neurological:   denies headaches, extremity weakness or numbness.  Psychiatric: denies depression or anxiety.  Endocrine:   denies excessive thirst or urination.  Heme/Lymph:  denies history of anemia, easy bruising or bleeding.      PHYSICAL EXAM:     Vitals:    12/26/24 1619   BP: 116/74   Pulse: 74   Weight: 245 lb 12.8 oz (111.5 kg)     Body mass index is 39.67 kg/m².     Patient offered chaperone, patient declined    Constitutional: well developed, well nourished    Psychiatric:  Oriented to time, place, person and situation. Appropriate mood and affect    Pelvic Exam:  External Genitalia: normal appearance, hair distribution, and no lesions  Urethral Meatus:  normal in size, location, without lesions and prolapse  Bladder:  No fullness, masses or tenderness  Vagina:  Normal appearance without lesions, no abnormal discharge  Cervix:  +IUD strings,Normal without tenderness on motion  Uterus: normal in size, contour, position, mobility, without tenderness  Adnexa: normal without masses or tenderness  Perineum: normal  Anus: no hemorroids   Lymph node: no inguinal lymph nodes    Assessment & Plan:  Priya was seen today for follow - up.    Diagnoses and all orders for this visit:    Presence of IUD    IUD appears in situ  Rto for annual in fall 2025 or sooner if concerns      MALACHI Zafar    This note was prepared using Dragon Medical voice recognition dictation software. As a result errors may occur. When identified these errors have been corrected. While every attempt is made to correct errors during dictation discrepancies may still exist.         [1]   Allergies  Allergen Reactions    Oysters NAUSEA AND VOMITING

## 2025-03-07 ENCOUNTER — OFFICE VISIT (OUTPATIENT)
Dept: OBGYN CLINIC | Facility: CLINIC | Age: 30
End: 2025-03-07

## 2025-03-07 VITALS
SYSTOLIC BLOOD PRESSURE: 122 MMHG | WEIGHT: 248.19 LBS | HEART RATE: 87 BPM | BODY MASS INDEX: 40 KG/M2 | DIASTOLIC BLOOD PRESSURE: 84 MMHG

## 2025-03-07 DIAGNOSIS — Z97.5 IUD (INTRAUTERINE DEVICE) IN PLACE: ICD-10-CM

## 2025-03-07 DIAGNOSIS — N93.9 ABNORMAL UTERINE BLEEDING: Primary | ICD-10-CM

## 2025-03-07 LAB
CONTROL LINE PRESENT WITH A CLEAR BACKGROUND (YES/NO): YES YES/NO
KIT LOT #: NORMAL NUMERIC
PREGNANCY TEST, URINE: NEGATIVE

## 2025-03-07 PROCEDURE — 99214 OFFICE O/P EST MOD 30 MIN: CPT | Performed by: NURSE PRACTITIONER

## 2025-03-07 PROCEDURE — 81025 URINE PREGNANCY TEST: CPT | Performed by: NURSE PRACTITIONER

## 2025-03-07 NOTE — PROGRESS NOTES
[facility]   Obstetrics and Gynecology    Priya Soriano is a 29 year old female  Patient's last menstrual period was 2025 (exact date).   Chief Complaint   Patient presents with    Gyn Problem     IUD was placed 4 months ago - cramping and spotting has continued since last period - Per PT   . IUD placed 2024    This period menses on  her normal period- lasts 6 days, medium flow, no pain. Then started again on  a few days after period ended around - spotting and had some clots that came out in shower dime size, not wearing a pad but sees when wiping.  Same partner, she reports no longer using condoms and he had STI testing.    Pap:10/2024 NILM, negative HPV  Contraception: paragard IUD    OBSTETRICS HISTORY:  OB History    Para Term  AB Living   0 0 0 0 0 0   SAB IAB Ectopic Multiple Live Births   0 0 0 0 0       GYNE HISTORY:  Menarche: 10 (3/7/2025  9:30 AM)  Period Cycle (Days): monthly (3/7/2025  9:30 AM)  Period Duration (Days): 7 (3/7/2025  9:30 AM)  Period Flow: modrate (3/7/2025  9:30 AM)  Use of Birth Control (if yes, specify type): Paraguard IUD (3/7/2025  9:30 AM)  Hx Prior Abnormal Pap: No (3/7/2025  9:30 AM)  Pap Date: 10/18/24 (3/7/2025  9:30 AM)  Pap Result Notes: neg hpv neg (3/7/2025  9:30 AM)      History   Sexual Activity    Sexual activity: Not Currently       MEDICAL HISTORY:  Past Medical History:    Anxiety    Depression    Migraine    with and without aura       SOCIAL HISTORY:  Social History     Socioeconomic History    Marital status: Single     Spouse name: Not on file    Number of children: Not on file    Years of education: Not on file    Highest education level: Not on file   Occupational History    Not on file   Tobacco Use    Smoking status: Never    Smokeless tobacco: Never   Vaping Use    Vaping status: Never Used   Substance and Sexual Activity    Alcohol use: Yes     Comment: very infrequent, 1-2/month    Drug use: Yes      Types: Cannabis     Comment: gummies    Sexual activity: Not Currently   Other Topics Concern    Not on file   Social History Narrative    Not on file     Social Drivers of Health     Food Insecurity: No Food Insecurity (6/2/2024)    Received from [facility]    Hunger Vital Sign     Worried About Running Out of Food in the Last Year: Never true     Ran Out of Food in the Last Year: Never true   Transportation Needs: No Transportation Needs (6/2/2024)    Received from [facility]    PRAPARE - Transportation     Lack of Transportation (Medical): No     Lack of Transportation (Non-Medical): No   Stress: Not on file   Housing Stability: Low Risk  (6/2/2024)    Received from [facility]    Housing Stability Vital Sign     Unable to Pay for Housing in the Last Year: No     Number of Places Lived in the Last Year: 1     Unstable Housing in the Last Year: No       MEDICATIONS:    Current Outpatient Medications:     rizatriptan 10 MG Oral Tablet Dispersible, every 28 days. FOR 21 DAYS IN, THEN REMOVE FOR 7 DAYS, Disp: , Rfl:     sertraline 50 MG Oral Tab, Take 1 tablet (50 mg total) by mouth daily., Disp: , Rfl:     buPROPion  MG Oral Tablet 24 Hr, Take 1 tablet (300 mg total) by mouth daily., Disp: , Rfl:     glycopyrrolate 1 MG Oral Tab, Take 1 tablet (1 mg total) by mouth 3 (three) times daily. (Patient not taking: Reported on 3/7/2025), Disp: , Rfl:     Rizatriptan Benzoate 5 MG Oral Tab, Take 1 tablet (5 mg total) by mouth as needed for Migraine., Disp: , Rfl:     ALLERGIES:  Allergies[1]      Review of Systems:  Constitutional:  Denies fatigue, night sweats, hot flashes  Cardiovascular:  denies chest pain or palpitations  Respiratory:  denies shortness of breath  Gastrointestinal:  denies heartburn, abdominal pain, diarrhea or constipation  Genitourinary:  denies dysuria, incontinence, abnormal vaginal discharge, vaginal itching see  HPI  Musculoskeletal:  denies back pain.  Skin/Breast:  Denies any breast pain, lumps, or discharge.   Neurological:  denies headaches, extremity weakness or numbness.  Psychiatric: denies depression or anxiety.  Endocrine:   denies excessive thirst or urination.  Heme/Lymph:  denies history of anemia, easy bruising or bleeding.      PHYSICAL EXAM:     Vitals:    03/07/25 0931   BP: 122/84   Pulse: 87   Weight: 248 lb 3.2 oz (112.6 kg)     Body mass index is 40.06 kg/m².     Patient offered chaperone, patient declined    Constitutional: well developed, well nourished    Psychiatric:  Oriented to time, place, person and situation. Appropriate mood and affect    Pelvic Exam:  External Genitalia: normal appearance, hair distribution, and no lesions  Urethral Meatus:  normal in size, location, without lesions and prolapse  Bladder:  No fullness, masses or tenderness  Vagina:  Normal appearance without lesions, no abnormal discharge  Cervix:  +IUD strings, light menses, Normal without tenderness on motion  Uterus: normal in size, contour, position, mobility, without tenderness  Adnexa: normal without masses or tenderness  Perineum: normal  Anus: no hemorroids   Lymph node: no inguinal lymph nodes    Assessment & Plan:  Priya was seen today for gyn problem.    Diagnoses and all orders for this visit:    Abnormal uterine bleeding  -     POC Urine pregnancy test [47182]  -     US PELVIS W EV (CPT=76856/69180); Future  -     Chlamydia/Gc Amplification  -     Trichomonas vaginalis, MARIALUISA (Vaginal/Cervical); Future    IUD (intrauterine device) in place  -     POC Urine pregnancy test [[id number]]  -     US PELVIS W EV (CPT=76856/86261); Future  -     Chlamydia/Gc Amplification  -     Trichomonas vaginalis, MARIALUISA (Vaginal/Cervical); Future    Urine pregnancy test today negative  Cultures done  Will send for pelvic ultrasound  Reviewed reasons for abnormal bleeding, call if increased flow or pain or other concerns        Zaira Riggins,  APRN    This note was prepared using Dragon Medical voice recognition dictation software. As a result errors may occur. When identified these errors have been corrected. While every attempt is made to correct errors during dictation discrepancies may still exist.         [1]   Allergies  Allergen Reactions    Oysters NAUSEA AND VOMITING

## 2025-03-10 LAB
C TRACH DNA SPEC QL NAA+PROBE: NEGATIVE
N GONORRHOEA DNA SPEC QL NAA+PROBE: NEGATIVE
T VAGINALIS RRNA SPEC QL NAA+PROBE: NEGATIVE

## 2025-03-12 ENCOUNTER — HOSPITAL ENCOUNTER (OUTPATIENT)
Dept: ULTRASOUND IMAGING | Facility: HOSPITAL | Age: 30
Discharge: HOME | End: 2025-03-12
Attending: NURSE PRACTITIONER
Payer: COMMERCIAL

## 2025-03-12 DIAGNOSIS — Z97.5 IUD (INTRAUTERINE DEVICE) IN PLACE: ICD-10-CM

## 2025-03-12 DIAGNOSIS — N93.9 ABNORMAL UTERINE BLEEDING: ICD-10-CM

## 2025-03-12 PROCEDURE — 76830 TRANSVAGINAL US NON-OB: CPT | Performed by: NURSE PRACTITIONER

## 2025-03-12 PROCEDURE — 76856 US EXAM PELVIC COMPLETE: CPT | Performed by: NURSE PRACTITIONER

## 2025-03-20 ENCOUNTER — TELEPHONE (OUTPATIENT)
Dept: OBGYN CLINIC | Facility: CLINIC | Age: 30
End: 2025-03-20

## 2025-03-21 NOTE — TELEPHONE ENCOUNTER
Due to the abnormal bleeding her cycle may not come when due. She can take another pregnancy test to be sure. Monitor for next cycle but if abnormal bleeding happens again needs a follow up

## 2025-03-21 NOTE — TELEPHONE ENCOUNTER
Patient informed of Zaira Riggins recommendations below. States the abnormal bleeding stopped on 3/10/25 and no bleeding since.   Last menstrual period 2/17/25, normally has monthly periods.   To Zaira Riggins to please advise.

## 2025-03-21 NOTE — TELEPHONE ENCOUNTER
Left message to return call. Ultrasound normal. Has she had a period since last visit? Is abnormal bleeding continuing?  If resolved, continue to monitor. If bleeding continuing let me know as we may need to discuss options to help with bleeding    Impression  CONCLUSION:     1. Well-positioned intrauterine device.  2. Normal endometrial thickness of 5 mm.  3. Bilateral 2.0 cm simple ovarian cystic lesions, which may reflect dominant follicles or simple ovarian cysts.             Dictated by (CST): Brayden Washington MD on 3/18/2025 at 12:51 PM      Finalized by (CST): Brayden Washington MD on 3/18/2025 at 12:54 PM

## 2025-03-21 NOTE — TELEPHONE ENCOUNTER
Patient called back she missed the call she states the bleeding has stopped but she hasn't gotten a regular period yet and would like to speak with a nurse.

## 2025-03-21 NOTE — TELEPHONE ENCOUNTER
Patient informed of Zaira Riggins recommendations and verbalized understanding. She will monitor next cycle and will let us know if abnormal, then we can schedule follow up appointment with Zaira Riggins.

## (undated) NOTE — LETTER
AUTHORIZATION FOR SURGICAL OPERATION OR OTHER PROCEDURE    1. I hereby authorize Zaira Riggins, and [facility] staff assigned to my case to perform the following operation and/or procedure at the [facility] site:    IUD INSERTION    2.  My physician has explained the nature and purpose of the operation or other procedure, possible alternative methods of treatment, the risks involved, and the possibility of complication to me.  I acknowledge that no guarantee has been made as to the result that may be obtained.  3.  I recognize that, during the course of this operation, or other procedure, unforseen conditions may necessitate additional or different procedure than those listed above.  I, therefore, further authorize and request that the above named physician, his/her physician assistants or designees perform such procedures as are, in his/her professional opinion, necessary and desirable.  4.  Any tissue or organs removed in the operation or other procedure may be disposed of by and at the discretion of the [facility] and [facility].  5.  I understand that in the event of a medical emergency, I will be transported by local paramedics to [facility] or other hospital emergency department.  6.  I certify that I have read and fully understand the above consent to operation and/or other procedure.    7.  I acknowledge that my physician has explained sedation/analgesia administration to me including the risks and benefits.  I consent to the administration of sedation/analgesia as may be necessary or desirable in the judgement of my physician.    Witness signature: ___________________________________________________ Date:  ______/______/_____                    Time:  ________ A.M.  P.M.       Patient Name:  ______________________________________________________  (please print)      Patient signature:   ___________________________________________________             Relationship to Patient:           []  Parent    Responsible person                          []  Spouse  In case of minor or                    [] Other  _____________   Incompetent name:  __________________________________________________                               (please print)      _____________      Responsible person  In case of minor or  Incompetent signature:  _______________________________________________    Statement of Physician  My signature below affirms that prior to the time of the procedure, I have explained to the patient and/or his/her guardian, the risks and benefits involved in the proposed treatment and any reasonable alternative to the proposed treatment.  I have also explained the risks and benefits involved in the refusal of the proposed treatment and have answered the patient's questions.                        Date:  ______/______/_______  Provider                      Signature:  __________________________________________________________       Time:  ___________ A.M    P.M.

## (undated) NOTE — LETTER
AUTHORIZATION FOR SURGICAL OPERATION OR OTHER PROCEDURE    1. I hereby authorize Madison AUGUSTINE, and [employer] staff assigned to my case to perform the following operation and/or procedure at the [place] [facility]:    [address]      2.  My physician has explained the nature and purpose of the operation or other procedure, possible alternative methods of treatment, the risks involved, and the possibility of complication to me. I acknowledge that no guarantee has been made as to the result that may be obtained. 3.  I recognize that, during the course of this operation, or other procedure, unforseen conditions may necessitate additional or different procedure than those listed above. I, therefore, further authorize and request that the above named physician, his/her physician assistants or designees perform such procedures as are, in his/her professional opinion, necessary and desirable. 4.  Any tissue or organs removed in the operation or other procedure may be disposed of by and at the discretion of the [place] [facility] and [facility]. 5.  I understand that in the event of a medical emergency, I will be transported by local paramedics to [facility] or other [facility] emergency department. 6.  I certify that I have read and fully understand the above consent to operation and/or other procedure. 7.  I acknowledge that my physician has explained sedation/analgesia administration to me including the risks and benefits. I consent to the administration of sedation/analgesia as may be necessary or desirable in the judgement of my physician. Witness signature: ___________________________________________________ Date:  ______/______/_____                    Time:  ________ A. M.  P.M.        Patient Name:  ______________________________________________________  (please print)      Patient signature: ___________________________________________________             Relationship to Patient:           []  Parent    Responsible person                          []  Spouse  In case of minor or                    [] Other  _____________   Incompetent name:  __________________________________________________                               (please print)      _____________      Responsible person  In case of minor or  Incompetent signature:  _______________________________________________    Statement of Physician  My signature below affirms that prior to the time of the procedure, I have explained to the patient and/or his/her guardian, the risks and benefits involved in the proposed treatment and any reasonable alternative to the proposed treatment. I have also explained the risks and benefits involved in the refusal of the proposed treatment and have answered the patient's questions.                         Date:  ______/______/_______  Provider                      Signature:  __________________________________________________________       Time:  ___________ A.M    P.M.